# Patient Record
Sex: FEMALE | Race: WHITE | Employment: OTHER | URBAN - NONMETROPOLITAN AREA
[De-identification: names, ages, dates, MRNs, and addresses within clinical notes are randomized per-mention and may not be internally consistent; named-entity substitution may affect disease eponyms.]

---

## 2018-01-03 ENCOUNTER — HOSPITAL ENCOUNTER (OUTPATIENT)
Dept: WOMENS IMAGING | Age: 63
Discharge: HOME | End: 2018-01-03
Payer: COMMERCIAL

## 2018-01-03 DIAGNOSIS — Z12.31 VISIT FOR SCREENING MAMMOGRAM: ICD-10-CM

## 2018-01-03 PROCEDURE — 77067 SCR MAMMO BI INCL CAD: CPT

## 2018-05-16 ENCOUNTER — HOSPITAL ENCOUNTER (EMERGENCY)
Age: 63
Discharge: HOME | End: 2018-05-16
Attending: EMERGENCY MEDICINE
Payer: COMMERCIAL

## 2018-05-16 VITALS
TEMPERATURE: 98.6 F | WEIGHT: 115 LBS | SYSTOLIC BLOOD PRESSURE: 123 MMHG | RESPIRATION RATE: 16 BRPM | HEART RATE: 83 BPM | DIASTOLIC BLOOD PRESSURE: 68 MMHG | OXYGEN SATURATION: 97 %

## 2018-05-16 DIAGNOSIS — N30.00 ACUTE CYSTITIS WITHOUT HEMATURIA: Primary | ICD-10-CM

## 2018-05-16 LAB
AMORPHOUS: ABNORMAL
BACTERIA: ABNORMAL /HPF
BILIRUBIN URINE: ABNORMAL
BLOOD, URINE: ABNORMAL
CASTS UA: ABNORMAL /LPF
CHARACTER, URINE: ABNORMAL
COLOR: ABNORMAL
CRYSTALS, UA: ABNORMAL
EPITHELIAL CELLS, UA: ABNORMAL /HPF
GLUCOSE URINE: ABNORMAL MG/DL
ICTOTEST: NEGATIVE
KETONES, URINE: ABNORMAL
LEUKOCYTE ESTERASE, URINE: ABNORMAL
NITRITE, URINE: ABNORMAL
PH UA: ABNORMAL
PROTEIN UA: ABNORMAL
RBC URINE: ABNORMAL /HPF
SPECIFIC GRAVITY, URINE: ABNORMAL (ref 1–1.03)
UROBILINOGEN, URINE: ABNORMAL EU/DL
WBC UA: ABNORMAL /HPF

## 2018-05-16 PROCEDURE — 99213 OFFICE O/P EST LOW 20 MIN: CPT

## 2018-05-16 PROCEDURE — 81001 URINALYSIS AUTO W/SCOPE: CPT

## 2018-05-16 PROCEDURE — 99202 OFFICE O/P NEW SF 15 MIN: CPT | Performed by: NURSE PRACTITIONER

## 2018-05-16 RX ORDER — NITROFURANTOIN 25; 75 MG/1; MG/1
100 CAPSULE ORAL 2 TIMES DAILY
Qty: 10 CAPSULE | Refills: 0 | Status: SHIPPED | OUTPATIENT
Start: 2018-05-16 | End: 2018-05-21

## 2018-05-16 ASSESSMENT — ENCOUNTER SYMPTOMS
NAUSEA: 0
DIARRHEA: 0
ABDOMINAL PAIN: 0
VOMITING: 0
CONSTIPATION: 0

## 2018-05-21 ENCOUNTER — NURSE TRIAGE (OUTPATIENT)
Dept: ADMINISTRATIVE | Age: 63
End: 2018-05-21

## 2018-06-16 ENCOUNTER — HOSPITAL ENCOUNTER (EMERGENCY)
Age: 63
Discharge: HOME | End: 2018-06-16
Payer: COMMERCIAL

## 2018-06-16 VITALS
SYSTOLIC BLOOD PRESSURE: 152 MMHG | HEIGHT: 66 IN | DIASTOLIC BLOOD PRESSURE: 72 MMHG | OXYGEN SATURATION: 98 % | WEIGHT: 115 LBS | HEART RATE: 89 BPM | BODY MASS INDEX: 18.48 KG/M2 | RESPIRATION RATE: 16 BRPM

## 2018-06-16 DIAGNOSIS — L25.5 CONTACT DERMATITIS DUE TO PLANT: Primary | ICD-10-CM

## 2018-06-16 PROCEDURE — 96372 THER/PROPH/DIAG INJ SC/IM: CPT

## 2018-06-16 PROCEDURE — 6360000002 HC RX W HCPCS: Performed by: NURSE PRACTITIONER

## 2018-06-16 PROCEDURE — 99213 OFFICE O/P EST LOW 20 MIN: CPT | Performed by: NURSE PRACTITIONER

## 2018-06-16 PROCEDURE — 99213 OFFICE O/P EST LOW 20 MIN: CPT

## 2018-06-16 RX ORDER — TRIAMCINOLONE ACETONIDE 1 MG/G
CREAM TOPICAL
Qty: 80 G | Refills: 0 | Status: SHIPPED | OUTPATIENT
Start: 2018-06-16 | End: 2018-10-05 | Stop reason: ALTCHOICE

## 2018-06-16 RX ORDER — METHYLPREDNISOLONE ACETATE 80 MG/ML
80 INJECTION, SUSPENSION INTRA-ARTICULAR; INTRALESIONAL; INTRAMUSCULAR; SOFT TISSUE ONCE
Status: COMPLETED | OUTPATIENT
Start: 2018-06-16 | End: 2018-06-16

## 2018-06-16 RX ORDER — METHYLPREDNISOLONE 4 MG/1
TABLET ORAL
Qty: 1 KIT | Refills: 0 | Status: SHIPPED | OUTPATIENT
Start: 2018-06-16 | End: 2018-06-16

## 2018-06-16 RX ORDER — METHYLPREDNISOLONE 4 MG/1
TABLET ORAL
Qty: 1 KIT | Refills: 0 | Status: SHIPPED | OUTPATIENT
Start: 2018-06-16 | End: 2018-06-22

## 2018-06-16 RX ADMIN — METHYLPREDNISOLONE ACETATE 80 MG: 80 INJECTION, SUSPENSION INTRA-ARTICULAR; INTRALESIONAL; INTRAMUSCULAR; SOFT TISSUE at 18:38

## 2018-06-16 ASSESSMENT — ENCOUNTER SYMPTOMS
COUGH: 0
CHOKING: 0
EYES NEGATIVE: 1
SORE THROAT: 0
GASTROINTESTINAL NEGATIVE: 1
ALLERGIC/IMMUNOLOGIC NEGATIVE: 1
SHORTNESS OF BREATH: 0

## 2018-06-22 ENCOUNTER — OFFICE VISIT (OUTPATIENT)
Dept: FAMILY MEDICINE CLINIC | Age: 63
End: 2018-06-22
Payer: COMMERCIAL

## 2018-06-22 VITALS
HEART RATE: 80 BPM | BODY MASS INDEX: 18.24 KG/M2 | SYSTOLIC BLOOD PRESSURE: 126 MMHG | RESPIRATION RATE: 18 BRPM | DIASTOLIC BLOOD PRESSURE: 86 MMHG | TEMPERATURE: 98.1 F | WEIGHT: 113 LBS

## 2018-06-22 DIAGNOSIS — L23.7 ALLERGIC CONTACT DERMATITIS DUE TO PLANT: Primary | ICD-10-CM

## 2018-06-22 PROCEDURE — G8419 CALC BMI OUT NRM PARAM NOF/U: HCPCS | Performed by: NURSE PRACTITIONER

## 2018-06-22 PROCEDURE — G8427 DOCREV CUR MEDS BY ELIG CLIN: HCPCS | Performed by: NURSE PRACTITIONER

## 2018-06-22 PROCEDURE — 1036F TOBACCO NON-USER: CPT | Performed by: NURSE PRACTITIONER

## 2018-06-22 PROCEDURE — 3017F COLORECTAL CA SCREEN DOC REV: CPT | Performed by: NURSE PRACTITIONER

## 2018-06-22 PROCEDURE — 96372 THER/PROPH/DIAG INJ SC/IM: CPT | Performed by: NURSE PRACTITIONER

## 2018-06-22 PROCEDURE — 99213 OFFICE O/P EST LOW 20 MIN: CPT | Performed by: NURSE PRACTITIONER

## 2018-06-22 RX ORDER — PREDNISONE 10 MG/1
TABLET ORAL
Qty: 30 TABLET | Refills: 0 | Status: SHIPPED | OUTPATIENT
Start: 2018-06-22 | End: 2018-07-02

## 2018-06-22 RX ORDER — HYDROXYZINE HYDROCHLORIDE 25 MG/1
25 TABLET, FILM COATED ORAL 3 TIMES DAILY PRN
Qty: 30 TABLET | Refills: 0 | Status: SHIPPED | OUTPATIENT
Start: 2018-06-22 | End: 2018-07-22

## 2018-06-22 RX ORDER — METHYLPREDNISOLONE ACETATE 80 MG/ML
80 INJECTION, SUSPENSION INTRA-ARTICULAR; INTRALESIONAL; INTRAMUSCULAR; SOFT TISSUE ONCE
Status: COMPLETED | OUTPATIENT
Start: 2018-06-22 | End: 2018-06-22

## 2018-06-22 RX ADMIN — METHYLPREDNISOLONE ACETATE 80 MG: 80 INJECTION, SUSPENSION INTRA-ARTICULAR; INTRALESIONAL; INTRAMUSCULAR; SOFT TISSUE at 08:25

## 2018-06-22 ASSESSMENT — PATIENT HEALTH QUESTIONNAIRE - PHQ9
1. LITTLE INTEREST OR PLEASURE IN DOING THINGS: 0
SUM OF ALL RESPONSES TO PHQ QUESTIONS 1-9: 0
2. FEELING DOWN, DEPRESSED OR HOPELESS: 0
SUM OF ALL RESPONSES TO PHQ9 QUESTIONS 1 & 2: 0

## 2018-06-28 ASSESSMENT — ENCOUNTER SYMPTOMS
EYES NEGATIVE: 1
GASTROINTESTINAL NEGATIVE: 1
RESPIRATORY NEGATIVE: 1

## 2018-08-20 ENCOUNTER — NURSE TRIAGE (OUTPATIENT)
Dept: ADMINISTRATIVE | Age: 63
End: 2018-08-20

## 2018-08-21 ENCOUNTER — OFFICE VISIT (OUTPATIENT)
Dept: FAMILY MEDICINE CLINIC | Age: 63
End: 2018-08-21
Payer: COMMERCIAL

## 2018-08-21 VITALS
DIASTOLIC BLOOD PRESSURE: 60 MMHG | RESPIRATION RATE: 16 BRPM | SYSTOLIC BLOOD PRESSURE: 100 MMHG | BODY MASS INDEX: 18.08 KG/M2 | TEMPERATURE: 97.8 F | WEIGHT: 112 LBS | HEART RATE: 96 BPM

## 2018-08-21 DIAGNOSIS — M54.50 ACUTE BILATERAL LOW BACK PAIN WITHOUT SCIATICA: Primary | ICD-10-CM

## 2018-08-21 PROCEDURE — 3017F COLORECTAL CA SCREEN DOC REV: CPT | Performed by: NURSE PRACTITIONER

## 2018-08-21 PROCEDURE — 1036F TOBACCO NON-USER: CPT | Performed by: NURSE PRACTITIONER

## 2018-08-21 PROCEDURE — 99213 OFFICE O/P EST LOW 20 MIN: CPT | Performed by: NURSE PRACTITIONER

## 2018-08-21 PROCEDURE — G8427 DOCREV CUR MEDS BY ELIG CLIN: HCPCS | Performed by: NURSE PRACTITIONER

## 2018-08-21 PROCEDURE — G8419 CALC BMI OUT NRM PARAM NOF/U: HCPCS | Performed by: NURSE PRACTITIONER

## 2018-08-21 RX ORDER — CYCLOBENZAPRINE HCL 5 MG
5 TABLET ORAL 3 TIMES DAILY PRN
Qty: 30 TABLET | Refills: 0 | Status: SHIPPED | OUTPATIENT
Start: 2018-08-21 | End: 2018-08-31

## 2018-08-21 RX ORDER — METHYLPREDNISOLONE 4 MG/1
TABLET ORAL
Qty: 1 KIT | Refills: 0 | Status: SHIPPED | OUTPATIENT
Start: 2018-08-21 | End: 2018-08-27

## 2018-08-21 ASSESSMENT — ENCOUNTER SYMPTOMS
WHEEZING: 0
COLOR CHANGE: 0
NAUSEA: 0
VOMITING: 0
BOWEL INCONTINENCE: 0
BACK PAIN: 1
SHORTNESS OF BREATH: 0
DIARRHEA: 0
ABDOMINAL PAIN: 0

## 2018-08-21 NOTE — PATIENT INSTRUCTIONS
Ibuprofen 600 mg every 8 hours around the clock     Patient Education        Low Back Pain: Exercises  Your Care Instructions  Here are some examples of typical rehabilitation exercises for your condition. Start each exercise slowly. Ease off the exercise if you start to have pain. Your doctor or physical therapist will tell you when you can start these exercises and which ones will work best for you. How to do the exercises  Press-up    1. Lie on your stomach, supporting your body with your forearms. 2. Press your elbows down into the floor to raise your upper back. As you do this, relax your stomach muscles and allow your back to arch without using your back muscles. As your press up, do not let your hips or pelvis come off the floor. 3. Hold for 15 to 30 seconds, then relax. 4. Repeat 2 to 4 times. Alternate arm and leg (bird dog) exercise    Do this exercise slowly. Try to keep your body straight at all times, and do not let one hip drop lower than the other. 1. Start on the floor, on your hands and knees. 2. Tighten your belly muscles. 3. Raise one leg off the floor, and hold it straight out behind you. Be careful not to let your hip drop down, because that will twist your trunk. 4. Hold for about 6 seconds, then lower your leg and switch to the other leg. 5. Repeat 8 to 12 times on each leg. 6. Over time, work up to holding for 10 to 30 seconds each time. 7. If you feel stable and secure with your leg raised, try raising the opposite arm straight out in front of you at the same time. Knee-to-chest exercise    1. Lie on your back with your knees bent and your feet flat on the floor. 2. Bring one knee to your chest, keeping the other foot flat on the floor (or keeping the other leg straight, whichever feels better on your lower back). 3. Keep your lower back pressed to the floor. Hold for at least 15 to 30 seconds. 4. Relax, and lower the knee to the starting position.   5. Repeat with the other leg. Repeat 2 to 4 times with each leg. 6. To get more stretch, put your other leg flat on the floor while pulling your knee to your chest.  Curl-ups    1. Lie on the floor on your back with your knees bent at a 90-degree angle. Your feet should be flat on the floor, about 12 inches from your buttocks. 2. Cross your arms over your chest. If this bothers your neck, try putting your hands behind your neck (not your head), with your elbows spread apart. 3. Slowly tighten your belly muscles and raise your shoulder blades off the floor. 4. Keep your head in line with your body, and do not press your chin to your chest.  5. Hold this position for 1 or 2 seconds, then slowly lower yourself back down to the floor. 6. Repeat 8 to 12 times. Pelvic tilt exercise    1. Lie on your back with your knees bent. 2. \"Brace\" your stomach. This means to tighten your muscles by pulling in and imagining your belly button moving toward your spine. You should feel like your back is pressing to the floor and your hips and pelvis are rocking back. 3. Hold for about 6 seconds while you breathe smoothly. 4. Repeat 8 to 12 times. Heel dig bridging    1. Lie on your back with both knees bent and your ankles bent so that only your heels are digging into the floor. Your knees should be bent about 90 degrees. 2. Then push your heels into the floor, squeeze your buttocks, and lift your hips off the floor until your shoulders, hips, and knees are all in a straight line. 3. Hold for about 6 seconds as you continue to breathe normally, and then slowly lower your hips back down to the floor and rest for up to 10 seconds. 4. Do 8 to 12 repetitions. Hamstring stretch in doorway    1. Lie on your back in a doorway, with one leg through the open door. 2. Slide your leg up the wall to straighten your knee. You should feel a gentle stretch down the back of your leg. 3. Hold the stretch for at least 15 to 30 seconds.  Do not arch your off the exercise if you start to have pain. Your doctor or physical therapist will tell you when you can start these exercises and which ones will work best for you. How to do the exercises  Press-up    5. Lie on your stomach, supporting your body with your forearms. 6. Press your elbows down into the floor to raise your upper back. As you do this, relax your stomach muscles and allow your back to arch without using your back muscles. As your press up, do not let your hips or pelvis come off the floor. 7. Hold for 15 to 30 seconds, then relax. 8. Repeat 2 to 4 times. Alternate arm and leg (bird dog) exercise    Do this exercise slowly. Try to keep your body straight at all times, and do not let one hip drop lower than the other. 8. Start on the floor, on your hands and knees. 9. Tighten your belly muscles. 10. Raise one leg off the floor, and hold it straight out behind you. Be careful not to let your hip drop down, because that will twist your trunk. 11. Hold for about 6 seconds, then lower your leg and switch to the other leg. 12. Repeat 8 to 12 times on each leg. 13. Over time, work up to holding for 10 to 30 seconds each time. 14. If you feel stable and secure with your leg raised, try raising the opposite arm straight out in front of you at the same time. Knee-to-chest exercise    7. Lie on your back with your knees bent and your feet flat on the floor. 8. Bring one knee to your chest, keeping the other foot flat on the floor (or keeping the other leg straight, whichever feels better on your lower back). 9. Keep your lower back pressed to the floor. Hold for at least 15 to 30 seconds. 10. Relax, and lower the knee to the starting position. 11. Repeat with the other leg. Repeat 2 to 4 times with each leg. 12. To get more stretch, put your other leg flat on the floor while pulling your knee to your chest.  Curl-ups    7. Lie on the floor on your back with your knees bent at a 90-degree angle.

## 2018-08-21 NOTE — PROGRESS NOTES
daily as needed for Muscle spasms    Ibuprofen 600 mg every 8 hours around the clock for pain  Alternate heat and ice   Take another week off at the gym and do at home exercises  Call office if symptoms don't continue to improve, or with any other questions or concerns    Return if symptoms worsen or fail to improve. Patient instructions given and reviewed.         Electronically signed by ZAHIRA Olivera CNP on 8/21/2018 at 8:08 AM

## 2018-09-26 ENCOUNTER — OFFICE VISIT (OUTPATIENT)
Dept: FAMILY MEDICINE CLINIC | Age: 63
End: 2018-09-26
Payer: COMMERCIAL

## 2018-09-26 VITALS
TEMPERATURE: 97.7 F | HEART RATE: 72 BPM | SYSTOLIC BLOOD PRESSURE: 112 MMHG | DIASTOLIC BLOOD PRESSURE: 72 MMHG | WEIGHT: 112.6 LBS | BODY MASS INDEX: 18.17 KG/M2 | RESPIRATION RATE: 16 BRPM

## 2018-09-26 DIAGNOSIS — E05.90 HYPERTHYROIDISM: Primary | ICD-10-CM

## 2018-09-26 PROCEDURE — G8419 CALC BMI OUT NRM PARAM NOF/U: HCPCS | Performed by: NURSE PRACTITIONER

## 2018-09-26 PROCEDURE — G8427 DOCREV CUR MEDS BY ELIG CLIN: HCPCS | Performed by: NURSE PRACTITIONER

## 2018-09-26 PROCEDURE — 3017F COLORECTAL CA SCREEN DOC REV: CPT | Performed by: NURSE PRACTITIONER

## 2018-09-26 PROCEDURE — 99213 OFFICE O/P EST LOW 20 MIN: CPT | Performed by: NURSE PRACTITIONER

## 2018-09-26 PROCEDURE — 1036F TOBACCO NON-USER: CPT | Performed by: NURSE PRACTITIONER

## 2018-09-26 ASSESSMENT — ENCOUNTER SYMPTOMS
ABDOMINAL PAIN: 0
WHEEZING: 0
SHORTNESS OF BREATH: 0
COUGH: 0
ROS SKIN COMMENTS: LOSING HAIR
EYE PAIN: 0
COLOR CHANGE: 0
BACK PAIN: 0

## 2018-09-26 NOTE — PATIENT INSTRUCTIONS
sign in to your [employer] account. Enter F554 in the [employer] box to learn more about \"Hyperthyroidism: Care Instructions. \"     If you do not have an account, please click on the \"Sign Up Now\" link. Current as of: May 12, 2017  Content Version: 11.7  © 5592-4113 [employer]. Care instructions adapted under license by [facility] ([facility]). If you have questions about a medical condition or this instruction, always ask your healthcare professional. [address] any warranty or liability for your use of this information. Patient Education        Hyperthyroidism: Care Instructions  Your Care Instructions  Hyperthyroidism occurs when the thyroid gland makes too much thyroid hormone. This speeds up your metabolism-how your body uses energy. This condition can cause you to be very active, lose weight, and have sleep problems, eye problems, and a fast heart rate. It can also cause a goiter. A goiter is an enlarged thyroid gland that you can see at the front of the neck. Hyperthyroidism is often caused by Graves' disease. In Graves' disease, the body's defense (immune) system attacks the thyroid gland. Your doctor may prescribe a beta-blocker medicine to slow your pulse and calm you down. But this is not a treatment for hyperthyroidism. It is given for your fast heart rate. Your doctor may also give you antithyroid medicine. This medicine keeps excess thyroid hormone in check. In some cases, doctors recommend radioactive iodine or surgery to remove the thyroid. After either of these treatments, you may need to take medicine to replace thyroid hormone for the rest of your life. Follow-up care is a key part of your treatment and safety. Be sure to make and go to all appointments, and call your doctor if you are having problems. It's also a good idea to know your test results and keep a list of the medicines you take. How can you care for yourself at home?   · Take your medicines exactly as prescribed. You need to take the thyroid medicine at the same time each day. Call your doctor if you think you are having a problem with your medicine. · Graves' disease can make your eyes sore. Use artificial tears, eye drops, and sunglasses to protect your eyes from dryness, wind, and sun. Raise your head with pillows at night to prevent your eyes from swelling. In some cases, taping your eyelids shut at night will keep your eyes from being dry in the morning. · Make sure you get enough calcium. Foods that are rich in calcium include milk, yogurt, cheese, and dark green vegetables. · If you need to gain weight, ask your doctor about special diets. · Do not eat kelp. Charlsie Cart is high in iodine, which can make hyperthyroidism worse. Charlsie Cart is commonly used in [employer] and other [employer] foods. You can use iodized salt and eat bread and seafood. Try to eat a balanced diet. · Do not use caffeine and other stimulants. These can make symptoms worse, such as a fast heartbeat, nervousness, and problems focusing. · Do not smoke. Smoking can make your condition worse and may lead to more serious eye problems. If you need help quitting, talk to your doctor about stop-smoking programs and medicines. These can increase your chances of quitting for good. · Lower your stress. Learn to use biofeedback, guided imagery, meditation, or other methods to relax. · Use creams or ointments for irritated skin. Ask your doctor which type to use. · Tell all your doctors about your condition. They need to know because some medicines contain iodine. When should you call for help? Call your doctor now or seek immediate medical care if:    · You have symptoms of a sudden, very high thyroid level (thyroid storm). These include:  ¨ Being nauseated, vomiting, and having diarrhea. ¨ Sweating a lot. ¨ Feeling extremely restless and confused. ¨ Having a high fever.   ¨ Having a fast heartbeat.     · You have sudden vision changes or eye pain.     · You have a fever or severe sore throat and are taking antithyroid medicines, such as PTU or methimazole.    Watch closely for changes in your health, and be sure to contact your doctor if:    · You have a sore throat or have problems swallowing.     · You have swollen, itchy, or red eyes or your other eye symptoms get worse, or you have new vision problems.     · You have signs of a low thyroid level (hypothyroidism). You may feel very tired, confused, or weak. Where can you learn more? Go to https://[employer]Task.it.cocone. org and sign in to your [employer] account. Enter N705 in the [employer] box to learn more about \"Hyperthyroidism: Care Instructions. \"     If you do not have an account, please click on the \"Sign Up Now\" link. Current as of: May 12, 2017  Content Version: 11.7  © 0652-9588 [employer]. Care instructions adapted under license by [facility] ([facility]). If you have questions about a medical condition or this instruction, always ask your healthcare professional. [place] [address] any warranty or liability for your use of this information. Patient Education          methimazole  Pronunciation:  me THIM a zole  Brand:  Tapazole  What is the most important information I should know about methimazole? You should not breast-feed while using this medicine. What is methimazole? Methimazole prevents the thyroid gland from producing too much thyroid hormone. Methimazole is used to treat hyperthyroidism (overactive thyroid). It is also used before thyroid surgery or radioactive iodine treatment. Methimazole may also be used for purposes not listed in this medication guide. What should I discuss with my healthcare provider before taking methimazole? You should not use methimazole if you are allergic to it, or:  · if you are pregnant or breast-feeding.   To make sure methimazole is safe for you, tell your doctor if you have:  · liver

## 2018-09-26 NOTE — PROGRESS NOTES
[facility]  [address] [address]  Dept: [phone]  Dept Fax: [phone]: [phone]     Visit Date:  9/26/2018      Patient:  Torsten Mancia  YOB: 1955    HPI:     Chief Complaint   Patient presents with   [address]     pt states that she was given results of hyperthyroidism,        HPI    Pt reports that she noticed about 2 weeks ago that she was losing more hair than normal, she called her OBGYN and they ordered TSH and Free T4 which showed hyperthyroidism. 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 Pt denies any heart racing, weakness or dizziness. She denies any fever, chills or weight changes. Pt does have hot flashes sometimes, but its usually when she is working out. Medications    Current Outpatient Prescriptions:     Biotin w/ Vitamins C & E (HAIR/SKIN/NAILS PO), Take by mouth, Disp: , Rfl:     Multiple Vitamins-Minerals (MULTIVITAMIN ADULT PO), Take by mouth, Disp: , Rfl:     Omega-3 Fatty Acids (OMEGA 3 500 PO), Take by mouth, Disp: , Rfl:     hydrocortisone 2.5 % cream, Apply topically 2 times daily. , Disp: 1 Tube, Rfl: 1    triamcinolone (KENALOG) 0.1 % cream, Apply topically 2 times daily. , Disp: 80 g, Rfl: 0    The patient has No Known Allergies. Past Medical History  Yulinaa Olivas  has no past medical history on file. Subjective:      Review of Systems   Constitutional: Negative for activity change, chills, fatigue and fever. Eyes: Negative for pain and visual disturbance. Respiratory: Negative for cough, shortness of breath and wheezing. Cardiovascular: Negative for chest pain, palpitations and leg swelling. Gastrointestinal: Negative for abdominal pain. Endocrine: Positive for heat intolerance. Negative for cold intolerance, polydipsia, polyphagia and polyuria.    Genitourinary: Negative for difficulty

## 2018-09-27 LAB
ABSOLUTE BASO #: 0 K/UL (ref 0–0.1)
ABSOLUTE EOS #: 0.1 K/UL (ref 0.1–0.4)
ABSOLUTE LYMPH #: 1.6 K/UL (ref 0.8–5.2)
ABSOLUTE MONO #: 0.6 K/UL (ref 0.1–0.9)
ABSOLUTE NEUT #: 3.7 K/UL (ref 1.3–9.1)
BASOPHILS RELATIVE PERCENT: 0.3 %
EOSINOPHILS RELATIVE PERCENT: 2.2 %
HCT VFR BLD CALC: 39.9 % (ref 36–48)
HEMOGLOBIN: 13.9 G/DL (ref 12–16)
LYMPHOCYTE %: 25.8 %
MCH RBC QN AUTO: 30.1 PG (ref 27–34)
MCHC RBC AUTO-ENTMCNC: 34.8 G/DL (ref 31–36)
MCV RBC AUTO: 86.4 FL (ref 80–100)
MONOCYTES # BLD: 9.3 %
NEUTROPHILS RELATIVE PERCENT: 62.2 %
PDW BLD-RTO: 12.8 % (ref 10.8–14.8)
PLATELETS: 260 K/UL (ref 150–450)
RBC: 4.62 M/UL (ref 4–5.5)
WBC: 6 K/UL (ref 3.7–10.8)

## 2018-09-28 LAB
T3 FREE: 4.5 PG/ML (ref 2.2–4.2)
THYROID PEROXIDASE ANTIBODY: 4 IU/ML

## 2018-10-02 ENCOUNTER — TELEPHONE (OUTPATIENT)
Dept: FAMILY MEDICINE CLINIC | Age: 63
End: 2018-10-02

## 2018-10-02 NOTE — TELEPHONE ENCOUNTER
We can call pt and see if she is having any other symptoms on her scalp that might be causing the hair loss, a rash, or and itching.

## 2018-10-03 ENCOUNTER — TELEPHONE (OUTPATIENT)
Dept: FAMILY MEDICINE CLINIC | Age: 63
End: 2018-10-03

## 2018-10-03 NOTE — TELEPHONE ENCOUNTER
Pt called and received lab work done last appt, needed to know if it was low iron or D, if the lab work shown anything regarding that, has appt on 10/5    Please advise

## 2018-10-05 ENCOUNTER — OFFICE VISIT (OUTPATIENT)
Dept: FAMILY MEDICINE CLINIC | Age: 63
End: 2018-10-05
Payer: COMMERCIAL

## 2018-10-05 VITALS
HEART RATE: 84 BPM | TEMPERATURE: 98.2 F | RESPIRATION RATE: 16 BRPM | DIASTOLIC BLOOD PRESSURE: 80 MMHG | SYSTOLIC BLOOD PRESSURE: 122 MMHG | BODY MASS INDEX: 18.17 KG/M2 | WEIGHT: 112.6 LBS

## 2018-10-05 DIAGNOSIS — L65.9 HAIR LOSS: Primary | ICD-10-CM

## 2018-10-05 DIAGNOSIS — R53.83 DECREASED ENERGY: ICD-10-CM

## 2018-10-05 PROCEDURE — G8427 DOCREV CUR MEDS BY ELIG CLIN: HCPCS | Performed by: NURSE PRACTITIONER

## 2018-10-05 PROCEDURE — 1036F TOBACCO NON-USER: CPT | Performed by: NURSE PRACTITIONER

## 2018-10-05 PROCEDURE — 99212 OFFICE O/P EST SF 10 MIN: CPT | Performed by: NURSE PRACTITIONER

## 2018-10-05 PROCEDURE — G8419 CALC BMI OUT NRM PARAM NOF/U: HCPCS | Performed by: NURSE PRACTITIONER

## 2018-10-05 PROCEDURE — 3017F COLORECTAL CA SCREEN DOC REV: CPT | Performed by: NURSE PRACTITIONER

## 2018-10-05 PROCEDURE — G8484 FLU IMMUNIZE NO ADMIN: HCPCS | Performed by: NURSE PRACTITIONER

## 2018-10-05 ASSESSMENT — ENCOUNTER SYMPTOMS
FACIAL SWELLING: 0
BACK PAIN: 0
TROUBLE SWALLOWING: 0
SORE THROAT: 0
EYE PAIN: 0

## 2018-10-05 NOTE — PROGRESS NOTES
[facility]  [address] [address]  Dept: [phone]  Dept Fax: [phone]: [phone]     Visit Date:  10/5/2018      Patient:  Nam Mooney  YOB: 1955    HPI:     Chief Complaint   Patient presents with    Alopecia     hair keeps falling out \"like crazy\". pts friend same sx's and had low vit d and low iron    Discuss Labs       HPI    Pt presents to the office for follow up from hair loss appointment. Pt originally presented to the office with abnormal TSH. She has not scheduled her thyroid uptake and scan yet because she was told that she had to stop her Omega 3, and multivitamins for 4 weeks prior to study, and she didn't want to do that. Pt brought a bag of hair that she noticed falling out over the past 2 days. Pt also is worried because she has decreased energy over the past few months. Pt is wondering if her iron, or vitamin D is low. Pt denies any dizziness, or syncope. Medications    Current Outpatient Prescriptions:     Biotin w/ Vitamins C & E (HAIR/SKIN/NAILS PO), Take by mouth, Disp: , Rfl:     Multiple Vitamins-Minerals (MULTIVITAMIN ADULT PO), Take by mouth, Disp: , Rfl:     Omega-3 Fatty Acids (OMEGA 3 500 PO), Take by mouth, Disp: , Rfl:     The patient has No Known Allergies. Past Medical History  Krystal Godwin  has no past medical history on file. Subjective:      Review of Systems   Constitutional: Positive for activity change. Negative for chills, fatigue and fever. HENT: Negative for congestion, ear pain, facial swelling, sore throat and trouble swallowing.         + hair loss     Eyes: Negative for pain and visual disturbance. Cardiovascular: Negative for chest pain and palpitations. Musculoskeletal: Negative for back pain and joint swelling. Neurological: Negative for dizziness, weakness and headaches.        Objective:     /80   Pulse 84   Temp 98.2 °F (36.8 °C) (Oral)   Resp 16   Wt 112 lb 9.6 oz (51.1 kg)   LMP 11/16/2017   BMI 18.17 kg/m²     Physical Exam   Constitutional: She is oriented to person, place, and time. She appears well-developed and well-nourished. No distress. HENT:   Head: Normocephalic and atraumatic. Nose: Nose normal.   Negative hair pull test.  Pt has no patches of hair loss, no rash or dermatitis to scalp. Eyes: Right eye exhibits no discharge. Left eye exhibits no discharge. Pulmonary/Chest: Effort normal. No respiratory distress. Neurological: She is alert and oriented to person, place, and time. Coordination normal.   Skin: Skin is warm and dry. Psychiatric: She has a normal mood and affect. Her behavior is normal. Judgment and thought content normal.   Vitals reviewed. Component      Latest Ref Rng & Units 9/26/2018   WBC      3.7 - 10.8 K/ul 6.0   RBC      4.00 - 5.50 M/ul 4.62   Hemoglobin Quant      12.0 - 16.0 g/dL 13.9   Hematocrit      36.0 - 48.0 % 39.9   MCV      80 - 100 fl 86.4   MCH      27.0 - 34.0 pg 30.1   MCHC      31.0 - 36.0 g/dl 34.8   RDW      10.8 - 14.8 % 12.8   Platelet Count      359 - 450 K/ul 260   Absolute Neut #      1.3 - 9.1 K/ul 3.7   Neutrophils %      % 62.2   Absolute Lymph #      0.8 - 5.2 K/ul 1.6   Lymphocyte %      % 25.8   Absolute Mono #      0.1 - 0.9 K/ul 0.6   Monocytes      % 9.3   Absolute Eos #      0.1 - 0.4 K/ul 0.1   Eosinophils %      % 2.2   Basophils #      0.0 - 0.1 K/ul 0.0   Basophils %      % 0.3   T3, Free      2.2 - 4.2 PG/ML 4.5 (H)   Thyroid Peroxidase Antibody      <9 IU/ML 4       Assessment/Plan:      Godfrey Wesley was seen today for alopecia and discuss labs. Diagnoses and all orders for this visit:    Hair loss  -     Vitamin B12 & Folate; Future  -     Vitamin D 1,25 Dihydroxy; Future  -     TSH; Future  -     T4; Future  -     Iron and TIBC; Future    Decreased energy  -     Vitamin B12 & Folate; Future  -     Vitamin D 1,25 Dihydroxy;  Future  - TSH; Future  -     T4; Future  -     Iron and TIBC; Future    - Pt reassured that hair loss of  daily is normal, and with her hair being long she it may look like she is losing more hair than she actually is.   - Pt will schedule her Thyroid uptake and scan today and stop her vitamins, pt aware that stopping the vitamins may not help the hair loss, but it is necessary that we explore the low TSH.  - Will await results from blood work and call pt  - Call office with any questions or concerns, or if symptoms are getting worse or changing    Return if symptoms worsen or fail to improve. Patient instructions given and reviewed.         Electronically signed by ZAHIRA Wen CNP on 10/5/2018 at 8:23 AM

## 2018-10-06 LAB
FOLATE: >20 UG/L
IRON SATURATION: 41 % (ref 20–50)
IRON, SERUM: 114 UG/DL (ref 37–145)
T4 TOTAL: 7.7 UG/DL (ref 4.5–12)
TOTAL IRON BINDING CAPACITY: 278 MCG/DL (ref 250–450)
UNSATURATED IRON BINDING CAPACITY: 164.1 UG/DL (ref 112–347)
VITAMIN B-12: 1904 PG/ML (ref 200–950)

## 2018-10-09 ENCOUNTER — TELEPHONE (OUTPATIENT)
Dept: FAMILY MEDICINE CLINIC | Age: 63
End: 2018-10-09

## 2018-10-09 DIAGNOSIS — R74.8 ELEVATED VITAMIN B12 LEVEL: Primary | ICD-10-CM

## 2018-10-10 LAB
ALBUMIN SERPL-MCNC: 4.6 G/DL (ref 3.5–5.2)
ALK PHOSPHATASE: 105 U/L (ref 30–121)
ALT SERPL-CCNC: 22 U/L (ref 5–40)
AST SERPL-CCNC: 24 U/L (ref 9–40)
BILIRUB SERPL-MCNC: 0.4 MG/DL
BILIRUBIN DIRECT: <0.2 MG/DL
TOTAL PROTEIN: 6.9 G/DL (ref 6.1–8.3)

## 2018-10-12 LAB
TSH SERPL DL<=0.05 MIU/L-ACNC: <0.01 UIU/ML (ref 0.4–4.1)
VITAMIN D 1,25-DIHYDROXY: 65.3 PG/ML (ref 20–82)

## 2018-10-15 ENCOUNTER — TELEPHONE (OUTPATIENT)
Dept: FAMILY MEDICINE CLINIC | Age: 63
End: 2018-10-15

## 2018-10-15 NOTE — TELEPHONE ENCOUNTER
She will have to remain off the vitamins until the thyroid testing. At her last appointment the hair loss appeared normal, as she had no bald spots and her hair pull test was negative. Reassure pt that  hairs lost per day is normal.  Once we sort out the thyroid, we can decide where she needs to follow up (either endocrine or possibly dermatology).   Thanks -JOE

## 2018-10-18 ENCOUNTER — TELEPHONE (OUTPATIENT)
Dept: FAMILY MEDICINE CLINIC | Age: 63
End: 2018-10-18

## 2018-10-18 DIAGNOSIS — L65.9 HAIR LOSS: Primary | ICD-10-CM

## 2018-10-18 DIAGNOSIS — R53.83 FATIGUE, UNSPECIFIED TYPE: ICD-10-CM

## 2018-11-01 ENCOUNTER — TELEPHONE (OUTPATIENT)
Dept: FAMILY MEDICINE CLINIC | Age: 63
End: 2018-11-01

## 2018-12-17 ENCOUNTER — HOSPITAL ENCOUNTER (EMERGENCY)
Age: 63
Discharge: HOME | End: 2018-12-17
Payer: COMMERCIAL

## 2018-12-17 VITALS
WEIGHT: 112 LBS | HEIGHT: 66 IN | SYSTOLIC BLOOD PRESSURE: 121 MMHG | OXYGEN SATURATION: 98 % | DIASTOLIC BLOOD PRESSURE: 71 MMHG | TEMPERATURE: 98.2 F | HEART RATE: 78 BPM | RESPIRATION RATE: 16 BRPM | BODY MASS INDEX: 18 KG/M2

## 2018-12-17 DIAGNOSIS — H60.501 ACUTE OTITIS EXTERNA OF RIGHT EAR, UNSPECIFIED TYPE: Primary | ICD-10-CM

## 2018-12-17 PROCEDURE — 99212 OFFICE O/P EST SF 10 MIN: CPT | Performed by: NURSE PRACTITIONER

## 2018-12-17 PROCEDURE — 99212 OFFICE O/P EST SF 10 MIN: CPT

## 2018-12-17 RX ORDER — OFLOXACIN 3 MG/ML
10 SOLUTION AURICULAR (OTIC) DAILY
Qty: 10 ML | Refills: 0 | Status: SHIPPED | OUTPATIENT
Start: 2018-12-17 | End: 2018-12-24

## 2018-12-17 ASSESSMENT — PAIN SCALES - GENERAL: PAINLEVEL_OUTOF10: 6

## 2018-12-17 ASSESSMENT — PAIN DESCRIPTION - PAIN TYPE: TYPE: ACUTE PAIN

## 2018-12-17 ASSESSMENT — ENCOUNTER SYMPTOMS
NAUSEA: 0
DIARRHEA: 0
VOMITING: 0

## 2018-12-17 ASSESSMENT — PAIN DESCRIPTION - ORIENTATION: ORIENTATION: RIGHT

## 2018-12-17 ASSESSMENT — PAIN DESCRIPTION - LOCATION: LOCATION: EAR

## 2018-12-21 ENCOUNTER — OFFICE VISIT (OUTPATIENT)
Dept: FAMILY MEDICINE CLINIC | Age: 63
End: 2018-12-21
Payer: COMMERCIAL

## 2018-12-21 VITALS
BODY MASS INDEX: 18.4 KG/M2 | TEMPERATURE: 98.2 F | DIASTOLIC BLOOD PRESSURE: 68 MMHG | HEART RATE: 84 BPM | WEIGHT: 114 LBS | RESPIRATION RATE: 16 BRPM | SYSTOLIC BLOOD PRESSURE: 98 MMHG

## 2018-12-21 DIAGNOSIS — Z12.11 SCREENING FOR COLON CANCER: ICD-10-CM

## 2018-12-21 DIAGNOSIS — Z13.220 SCREENING, LIPID: ICD-10-CM

## 2018-12-21 DIAGNOSIS — H66.001 ACUTE SUPPURATIVE OTITIS MEDIA OF RIGHT EAR WITHOUT SPONTANEOUS RUPTURE OF TYMPANIC MEMBRANE, RECURRENCE NOT SPECIFIED: Primary | ICD-10-CM

## 2018-12-21 PROCEDURE — 3017F COLORECTAL CA SCREEN DOC REV: CPT | Performed by: NURSE PRACTITIONER

## 2018-12-21 PROCEDURE — 99213 OFFICE O/P EST LOW 20 MIN: CPT | Performed by: NURSE PRACTITIONER

## 2018-12-21 PROCEDURE — 1036F TOBACCO NON-USER: CPT | Performed by: NURSE PRACTITIONER

## 2018-12-21 PROCEDURE — G8427 DOCREV CUR MEDS BY ELIG CLIN: HCPCS | Performed by: NURSE PRACTITIONER

## 2018-12-21 PROCEDURE — G8484 FLU IMMUNIZE NO ADMIN: HCPCS | Performed by: NURSE PRACTITIONER

## 2018-12-21 PROCEDURE — G8419 CALC BMI OUT NRM PARAM NOF/U: HCPCS | Performed by: NURSE PRACTITIONER

## 2018-12-21 RX ORDER — AMOXICILLIN 500 MG/1
500 CAPSULE ORAL 3 TIMES DAILY
Qty: 30 CAPSULE | Refills: 0 | Status: SHIPPED | OUTPATIENT
Start: 2018-12-21 | End: 2018-12-31

## 2018-12-21 ASSESSMENT — ENCOUNTER SYMPTOMS
TROUBLE SWALLOWING: 0
COUGH: 0
SINUS PAIN: 0
DIARRHEA: 0
SORE THROAT: 0

## 2018-12-21 NOTE — PROGRESS NOTES
[facility]  [address] [address]  Dept: [phone]  Dept Fax: [phone]: [phone]     Visit Date:  12/21/2018      Patient:  Samia Gandhi  YOB: 1955    HPI:     Chief Complaint   Patient presents with    Ear Fullness     Here today for [facility] followup right ear infection on monday. c/o still in pain. Pt presents to the office for follow up with ER visit and right ear pain. Pt reports that she thought it was getting better, but when she lays on the right side her ear gets swollen and more painful. Pt denies any drainage from the ear, but can feel that it is swollen. Otalgia    There is pain in the right ear. This is a new problem. Episode onset: 6 days. The problem occurs constantly. The problem has been gradually improving. There has been no fever. The pain is moderate. Pertinent negatives include no coughing, diarrhea, ear discharge, headaches or sore throat. She has tried ear drops for the symptoms. The treatment provided mild relief. There is no history of a chronic ear infection, hearing loss or a tympanostomy tube. Pt also would like to catch up on her shots and health maintenance. Medications    Current Outpatient Prescriptions:     amoxicillin (AMOXIL) 500 MG capsule, Take 1 capsule by mouth 3 times daily for 10 days, Disp: 30 capsule, Rfl: 0    ofloxacin (FLOXIN) 0.3 % otic solution, Place 10 drops into the right ear daily for 7 days, Disp: 10 mL, Rfl: 0    Biotin w/ Vitamins C & E (HAIR/SKIN/NAILS PO), Take by mouth, Disp: , Rfl:     Multiple Vitamins-Minerals (MULTIVITAMIN ADULT PO), Take by mouth, Disp: , Rfl:     Omega-3 Fatty Acids (OMEGA 3 500 PO), Take by mouth, Disp: , Rfl:     The patient has No Known Allergies. Past Medical History  Re Flannery  has no past medical history on file. Subjective:      Review of Systems   HENT: Positive for ear pain.  Negative for congestion, ear discharge, postnasal drip, sinus pain, sore throat and trouble swallowing. Respiratory: Negative for cough. Cardiovascular: Negative for chest pain. Gastrointestinal: Negative for diarrhea. Neurological: Negative for headaches. Objective:     BP 98/68 (Site: Left Upper Arm)   Pulse 84   Temp 98.2 °F (36.8 °C) (Oral)   Resp 16   Wt 114 lb (51.7 kg)   LMP 11/16/2017   BMI 18.40 kg/m²     Physical Exam   Constitutional: She is oriented to person, place, and time. She appears well-developed and well-nourished. No distress. HENT:   Head: Normocephalic and atraumatic. Right Ear: Hearing and external ear normal. There is swelling and tenderness. No drainage. Left Ear: Hearing, tympanic membrane, external ear and ear canal normal.   Nose: Nose normal.   Mouth/Throat: Uvula is midline, oropharynx is clear and moist and mucous membranes are normal.   Eyes: Conjunctivae are normal. Right eye exhibits no discharge. Left eye exhibits no discharge. Pulmonary/Chest: Effort normal. No respiratory distress. Neurological: She is alert and oriented to person, place, and time. Coordination normal.   Skin: Skin is warm and dry. Psychiatric: She has a normal mood and affect. Her behavior is normal. Judgment and thought content normal.   Vitals reviewed. Assessment/Plan:      Juan Miguel Stokes was seen today for ear fullness. Diagnoses and all orders for this visit:    Acute suppurative otitis media of right ear without spontaneous rupture of tympanic membrane, recurrence not specified  -     amoxicillin (AMOXIL) 500 MG capsule; Take 1 capsule by mouth 3 times daily for 10 days    Screening for colon cancer  -     [facility] Valentina Bailey MD ([place])    Screening, lipid  -     Lipid Panel; Future    - Encouraged annual FLU VACCINE after October 1st- Declined   - Encouraged TETANUS SHOT (TdaP/ ADACEL/ BOOSTRIX) per personal pharmacy.   - Encouraged NEW SHINGLES SHOT [facility]) - check with insurance re coverage and location of administration.  - Offered Hep C screening and HIV testing, pt declines due to lack of risk factors  - Lipid screening order given. - Pelvic exam done per [facility]- done yearly    Return in about 1 year (around 12/21/2019), or if symptoms worsen or fail to improve. Patient given educational materials - see patient instructions. Discussed use, benefit, and side effects of prescribed medications. All patient questions answered. Pt voiced understanding.         Electronically signed by ZAHIRA Robles CNP on 12/21/2018 at 2:28 PM

## 2018-12-23 LAB
CHOLESTEROL/HDL RATIO: 2.6
CHOLESTEROL: 206 MG/DL
HDLC SERPL-MCNC: 80.5 MG/DL
LDL CHOLESTEROL CALCULATED: 117 MG/DL
LDL/HDL RATIO: 1.4
TRIGL SERPL-MCNC: 44 MG/DL
VLDLC SERPL CALC-MCNC: 9 MG/DL

## 2018-12-26 ENCOUNTER — TELEPHONE (OUTPATIENT)
Dept: FAMILY MEDICINE CLINIC | Age: 63
End: 2018-12-26

## 2018-12-26 DIAGNOSIS — E78.00 ELEVATED LDL CHOLESTEROL LEVEL: ICD-10-CM

## 2018-12-26 DIAGNOSIS — E78.00 ELEVATED CHOLESTEROL: Primary | ICD-10-CM

## 2018-12-26 NOTE — TELEPHONE ENCOUNTER
----- Message from ZAHIRA Salinas CNP sent at 12/24/2018  8:26 AM EST -----  Please call pt and let her know that her cholesterol is slightly elevated at 206, and her LDL is elevated at 117,  however her HDL or good cholesterol is 80, which is very good. Continue diet and exercise and we will repeat a total cholesterol and LDL in 6 months.   Thanks -WS

## 2019-01-15 ENCOUNTER — HOSPITAL ENCOUNTER (OUTPATIENT)
Dept: WOMENS IMAGING | Age: 64
Discharge: HOME | End: 2019-01-15
Payer: COMMERCIAL

## 2019-01-15 DIAGNOSIS — Z12.31 VISIT FOR SCREENING MAMMOGRAM: ICD-10-CM

## 2019-01-15 PROCEDURE — 77067 SCR MAMMO BI INCL CAD: CPT

## 2019-03-22 ENCOUNTER — TELEPHONE (OUTPATIENT)
Dept: FAMILY MEDICINE CLINIC | Age: 64
End: 2019-03-22

## 2019-03-22 ENCOUNTER — HOSPITAL ENCOUNTER (OUTPATIENT)
Dept: GENERAL RADIOLOGY | Age: 64
Discharge: HOME | End: 2019-03-22
Payer: COMMERCIAL

## 2019-03-22 ENCOUNTER — HOSPITAL ENCOUNTER (OUTPATIENT)
Age: 64
Discharge: HOME | End: 2019-03-22
Payer: COMMERCIAL

## 2019-03-22 ENCOUNTER — OFFICE VISIT (OUTPATIENT)
Dept: FAMILY MEDICINE CLINIC | Age: 64
End: 2019-03-22
Payer: COMMERCIAL

## 2019-03-22 VITALS
SYSTOLIC BLOOD PRESSURE: 94 MMHG | RESPIRATION RATE: 16 BRPM | HEART RATE: 80 BPM | WEIGHT: 118.13 LBS | DIASTOLIC BLOOD PRESSURE: 62 MMHG | TEMPERATURE: 97.9 F | BODY MASS INDEX: 19.07 KG/M2

## 2019-03-22 DIAGNOSIS — R63.5 WEIGHT GAIN: ICD-10-CM

## 2019-03-22 DIAGNOSIS — R93.5 ABNORMAL ABDOMINAL X-RAY: Primary | ICD-10-CM

## 2019-03-22 DIAGNOSIS — R14.0 ABDOMINAL BLOATING: Primary | ICD-10-CM

## 2019-03-22 DIAGNOSIS — R14.0 ABDOMINAL BLOATING: ICD-10-CM

## 2019-03-22 PROCEDURE — G8484 FLU IMMUNIZE NO ADMIN: HCPCS | Performed by: NURSE PRACTITIONER

## 2019-03-22 PROCEDURE — G8420 CALC BMI NORM PARAMETERS: HCPCS | Performed by: NURSE PRACTITIONER

## 2019-03-22 PROCEDURE — 99213 OFFICE O/P EST LOW 20 MIN: CPT | Performed by: NURSE PRACTITIONER

## 2019-03-22 PROCEDURE — G8427 DOCREV CUR MEDS BY ELIG CLIN: HCPCS | Performed by: NURSE PRACTITIONER

## 2019-03-22 PROCEDURE — 74019 RADEX ABDOMEN 2 VIEWS: CPT

## 2019-03-22 PROCEDURE — 1036F TOBACCO NON-USER: CPT | Performed by: NURSE PRACTITIONER

## 2019-03-22 PROCEDURE — 3017F COLORECTAL CA SCREEN DOC REV: CPT | Performed by: NURSE PRACTITIONER

## 2019-03-22 ASSESSMENT — ENCOUNTER SYMPTOMS
SHORTNESS OF BREATH: 0
WHEEZING: 0
NAUSEA: 0
DIARRHEA: 0
COUGH: 0
EYE PAIN: 0
ABDOMINAL PAIN: 0
VOMITING: 0
CONSTIPATION: 0
BLOOD IN STOOL: 0
COLOR CHANGE: 0
BACK PAIN: 0

## 2019-03-29 ENCOUNTER — HOSPITAL ENCOUNTER (OUTPATIENT)
Dept: ULTRASOUND IMAGING | Age: 64
Discharge: HOME | End: 2019-03-29
Payer: COMMERCIAL

## 2019-03-29 DIAGNOSIS — R93.5 ABNORMAL ABDOMINAL X-RAY: ICD-10-CM

## 2019-03-29 PROCEDURE — 76770 US EXAM ABDO BACK WALL COMP: CPT

## 2019-04-03 ENCOUNTER — TELEPHONE (OUTPATIENT)
Dept: FAMILY MEDICINE CLINIC | Age: 64
End: 2019-04-03

## 2019-04-03 DIAGNOSIS — N28.1 RENAL CYST, LEFT: Primary | ICD-10-CM

## 2019-04-03 DIAGNOSIS — N20.0 KIDNEY STONES: ICD-10-CM

## 2019-04-03 DIAGNOSIS — N28.1 RENAL CYST: ICD-10-CM

## 2019-04-03 NOTE — TELEPHONE ENCOUNTER
I spoke to the pt and she stated that she checked with her insurance and Dr. Chuy Darby office is on her plan. Referral placed in Epic for a referral to [facility]. I also called that office and left a message on their new patient scheduling line to have them call the pt to schedule. Pt notified they will be contacting her. Pt is asymptomatic.

## 2019-04-03 NOTE — TELEPHONE ENCOUNTER
Pt notified  She will check her insurance and call us back. ----- Message from ZAHIRA Hurst CNP sent at 3/29/2019 10:10 AM EDT -----  Please let pt know that her US shows bilateral kidney stones, a small cyst on her left kidney and some evidence of possible renal disease. Make sure she is not having any kidney stone symptoms (pain, blood in urine, or difficulty urinating). She needs a referral to urology for kidney stones and evaluate the cyst, we can try and get an appointment ASAP for her.   Thanks -WS

## 2019-04-14 ENCOUNTER — HOSPITAL ENCOUNTER (EMERGENCY)
Age: 64
Discharge: HOME | End: 2019-04-14
Payer: COMMERCIAL

## 2019-04-14 VITALS
TEMPERATURE: 98.1 F | HEART RATE: 74 BPM | WEIGHT: 115 LBS | OXYGEN SATURATION: 97 % | HEIGHT: 66 IN | SYSTOLIC BLOOD PRESSURE: 126 MMHG | DIASTOLIC BLOOD PRESSURE: 67 MMHG | BODY MASS INDEX: 18.48 KG/M2 | RESPIRATION RATE: 16 BRPM

## 2019-04-14 DIAGNOSIS — H66.004 RECURRENT ACUTE SUPPURATIVE OTITIS MEDIA OF RIGHT EAR WITHOUT SPONTANEOUS RUPTURE OF TYMPANIC MEMBRANE: Primary | ICD-10-CM

## 2019-04-14 PROCEDURE — 99213 OFFICE O/P EST LOW 20 MIN: CPT | Performed by: NURSE PRACTITIONER

## 2019-04-14 PROCEDURE — 99212 OFFICE O/P EST SF 10 MIN: CPT

## 2019-04-14 RX ORDER — CEFADROXIL 500 MG/1
500 CAPSULE ORAL 2 TIMES DAILY
Qty: 20 CAPSULE | Refills: 0 | Status: SHIPPED | OUTPATIENT
Start: 2019-04-14 | End: 2019-04-22

## 2019-04-14 ASSESSMENT — ENCOUNTER SYMPTOMS
FACIAL SWELLING: 1
SINUS PRESSURE: 1
ALLERGIC/IMMUNOLOGIC NEGATIVE: 1
COUGH: 0
EYES NEGATIVE: 1
SINUS PAIN: 1
CHEST TIGHTNESS: 0

## 2019-04-14 ASSESSMENT — PAIN DESCRIPTION - ORIENTATION: ORIENTATION: RIGHT

## 2019-04-14 ASSESSMENT — PAIN SCALES - GENERAL: PAINLEVEL_OUTOF10: 7

## 2019-04-14 ASSESSMENT — PAIN DESCRIPTION - FREQUENCY: FREQUENCY: CONTINUOUS

## 2019-04-14 ASSESSMENT — PAIN DESCRIPTION - PROGRESSION: CLINICAL_PROGRESSION: GRADUALLY WORSENING

## 2019-04-14 ASSESSMENT — PAIN DESCRIPTION - DESCRIPTORS: DESCRIPTORS: ACHING;SORE

## 2019-04-14 ASSESSMENT — PAIN DESCRIPTION - PAIN TYPE: TYPE: ACUTE PAIN

## 2019-04-14 ASSESSMENT — PAIN - FUNCTIONAL ASSESSMENT: PAIN_FUNCTIONAL_ASSESSMENT: ACTIVITIES ARE NOT PREVENTED

## 2019-04-14 ASSESSMENT — PAIN DESCRIPTION - LOCATION: LOCATION: EAR

## 2019-04-14 ASSESSMENT — PAIN DESCRIPTION - ONSET: ONSET: GRADUAL

## 2019-04-14 NOTE — ED PROVIDER NOTES
[address] [facility] Encounter      [facility]       Chief Complaint   Patient presents with    Otalgia     Right       Nurses Notes reviewed and I agree except as noted in the HPI. HISTORY OF PRESENT ILLNESS   Viki Dickens is a 60 y.o. female who presents with right ear pain and swelling x 1 day. Hx of recurrent ear infection. Last one was Dec of 2018. She does not swim, no ear plugs, or sports. Reports she has had recurrent ear infections as a child. Has tried warm compresses and this has not helped. Had ear drops from last episode and this has not worked as well. REVIEW OF SYSTEMS     Review of Systems   Constitutional: Positive for activity change and fatigue. HENT: Positive for congestion, ear pain, facial swelling, sinus pressure and sinus pain. Negative for ear discharge. Eyes: Negative. Respiratory: Negative for cough and chest tightness. Cardiovascular: Negative. Endocrine: Negative. Musculoskeletal: Negative. Skin: Positive for pallor. Allergic/Immunologic: Negative. Neurological: Negative for dizziness and headaches. Hematological: Positive for adenopathy. Psychiatric/Behavioral: Negative. PAST MEDICAL HISTORY   History reviewed. No pertinent past medical history. SURGICAL HISTORY     Patient  has a past surgical history that includes [place] tooth extraction. CURRENT MEDICATIONS       Previous Medications    BIOTIN W/ VITAMINS C & E (HAIR/SKIN/NAILS PO)    Take by mouth    MULTIPLE VITAMINS-MINERALS (MULTIVITAMIN ADULT PO)    Take by mouth    OMEGA-3 FATTY ACIDS (OMEGA 3 500 PO)    Take by mouth    PROBIOTIC PRODUCT (PROBIOTIC DAILY PO)    Take by mouth daily       ALLERGIES     Patient is has No Known Allergies. FAMILY HISTORY     Patient'sfamily history includes No Known Problems in her mother. SOCIAL HISTORY     Patient  reports that she has never smoked.  She has never used smokeless tobacco. She reports that she drinks alcohol. She reports that she does not use drugs. PHYSICAL EXAM     ED TRIAGE VITALS  BP: 126/67, Temp: 98.1 °F (36.7 °C), Pulse: 74, Resp: 16, SpO2: 97 %  Physical Exam   Constitutional: She is oriented to person, place, and time. She appears well-developed and well-nourished. HENT:   Head: Normocephalic. Right Ear: There is swelling and tenderness. There is mastoid tenderness. Tympanic membrane is injected, erythematous and bulging. Decreased hearing is noted. Nose: Nose normal.   Mouth/Throat: Uvula is midline, oropharynx is clear and moist and mucous membranes are normal.   Neck: Normal range of motion. Neck supple. Cardiovascular: Normal rate, regular rhythm and normal heart sounds. Pulmonary/Chest: Effort normal.   Musculoskeletal: Normal range of motion. Lymphadenopathy:     She has cervical adenopathy. Neurological: She is alert and oriented to person, place, and time. Skin: Skin is warm and dry. Capillary refill takes less than 2 seconds. Psychiatric: She has a normal mood and affect. Her behavior is normal.   Nursing note and vitals reviewed. DIAGNOSTIC RESULTS   Labs: No results found for this visit on 04/14/19. IMAGING:  No orders to display     URGENT CARE COURSE:     Vitals:    04/14/19 1646   BP: 126/67   Pulse: 74   Resp: 16   Temp: 98.1 °F (36.7 °C)   TempSrc: Oral   SpO2: 97%   Weight: 115 lb (52.2 kg)   Height: 5' 6\" (1.676 m)       Medications - No data to display  PROCEDURES:  None  FINALIMPRESSION      1.  Recurrent acute suppurative otitis media of right ear without spontaneous rupture of tympanic membrane        DISPOSITION/PLAN   DISPOSITION Decision To Discharge 04/14/2019 05:08:02 PM    PATIENT REFERRED TO:  Jennifer Morales MD  [address]  [phone]    In 3 days  As needed, If symptoms worsen    DISCHARGE MEDICATIONS:  New Prescriptions    CEFADROXIL (DURICEF) 500 MG CAPSULE    Take 1 capsule by mouth 2 times daily for 10 days     Current Discharge Medication List          ZAHIRA Bell CNP, APRN - CNP  04/14/19 9911

## 2019-04-14 NOTE — ED TRIAGE NOTES
Pt to [facility] ambulatory with right ear pain. This started on Friday. No fevers.

## 2019-04-19 ENCOUNTER — TELEPHONE (OUTPATIENT)
Dept: FAMILY MEDICINE CLINIC | Age: 64
End: 2019-04-19

## 2019-04-19 DIAGNOSIS — H66.004 RECURRENT ACUTE SUPPURATIVE OTITIS MEDIA OF RIGHT EAR: Primary | ICD-10-CM

## 2019-04-22 ENCOUNTER — OFFICE VISIT (OUTPATIENT)
Dept: UROLOGY | Age: 64
End: 2019-04-22
Payer: COMMERCIAL

## 2019-04-22 VITALS
DIASTOLIC BLOOD PRESSURE: 60 MMHG | WEIGHT: 119 LBS | HEIGHT: 66 IN | SYSTOLIC BLOOD PRESSURE: 116 MMHG | BODY MASS INDEX: 19.13 KG/M2

## 2019-04-22 DIAGNOSIS — N20.0 KIDNEY STONES: Primary | ICD-10-CM

## 2019-04-22 LAB
BILIRUBIN URINE: NEGATIVE
BLOOD URINE, POC: ABNORMAL
CHARACTER, URINE: CLEAR
COLOR, URINE: YELLOW
GLUCOSE URINE: NEGATIVE MG/DL
KETONES, URINE: NEGATIVE
LEUKOCYTE CLUMPS, URINE: NEGATIVE
NITRITE, URINE: NEGATIVE
PH, URINE: 6 (ref 5–9)
PROTEIN, URINE: ABNORMAL MG/DL
SPECIFIC GRAVITY, URINE: 1.02 (ref 1–1.03)
UROBILINOGEN, URINE: 0.2 EU/DL (ref 0–1)

## 2019-04-22 PROCEDURE — 99244 OFF/OP CNSLTJ NEW/EST MOD 40: CPT | Performed by: UROLOGY

## 2019-04-22 PROCEDURE — 81003 URINALYSIS AUTO W/O SCOPE: CPT | Performed by: UROLOGY

## 2019-04-22 PROCEDURE — G8427 DOCREV CUR MEDS BY ELIG CLIN: HCPCS | Performed by: UROLOGY

## 2019-04-22 PROCEDURE — G8420 CALC BMI NORM PARAMETERS: HCPCS | Performed by: UROLOGY

## 2019-04-22 NOTE — TELEPHONE ENCOUNTER
Referral placed, their office will contact patient to schedule. Patient previously notified of this.

## 2019-04-22 NOTE — TELEPHONE ENCOUNTER
Has she been seen here for it? We usually refer to ENT if symptoms are severe, refractory to treatment, or hearing is affected.   CAROLYN

## 2019-04-22 NOTE — PROGRESS NOTES
Ms. Shanice Mitchell was seen today in consultation for kidney stones and a renal cyst.  She recently had an abdominal and renal ultrasound and this demonstrated stones and a cyst.  She has no symptoms. She admits that she does not drink much water. This is a new problem and she has no related symptoms. History reviewed. No pertinent past medical history. Past Surgical History:   Procedure Laterality Date    WISDOM TOOTH EXTRACTION         Current Outpatient Medications on File Prior to Visit   Medication Sig Dispense Refill    Probiotic Product (PROBIOTIC DAILY PO) Take by mouth daily      Biotin w/ Vitamins C & E (HAIR/SKIN/NAILS PO) Take by mouth      Multiple Vitamins-Minerals (MULTIVITAMIN ADULT PO) Take by mouth      Omega-3 Fatty Acids (OMEGA 3 500 PO) Take by mouth       No current facility-administered medications on file prior to visit.         No Known Allergies    Family History   Problem Relation Age of Onset    No Known Problems Mother        Social History     Socioeconomic History    Marital status: Single     Spouse name: Not on file    Number of children: Not on file    Years of education: Not on file    Highest education level: Not on file   Occupational History    Not on file   Social Needs    Financial resource strain: Not on file    Food insecurity:     Worry: Not on file     Inability: Not on file    Transportation needs:     Medical: Not on file     Non-medical: Not on file   Tobacco Use    Smoking status: Never Smoker    Smokeless tobacco: Never Used   Substance and Sexual Activity    Alcohol use: Yes     Comment: rare    Drug use: No    Sexual activity: Not on file   Lifestyle    Physical activity:     Days per week: Not on file     Minutes per session: Not on file    Stress: Not on file   Relationships    Social connections:     Talks on phone: Not on file     Gets together: Not on file     Attends Confucianist service: Not on file     Active member of club or

## 2019-04-22 NOTE — TELEPHONE ENCOUNTER
Patient went to  over the weekend for right ear infection. States that she has been having ongoing issues and was told that she would need referred to ENT if it continued. (Had issues back in December also)  She is on ATB currently with no benefit. Hearing is affected because ear is swollen. Please advise.

## 2019-05-03 ENCOUNTER — HOSPITAL ENCOUNTER (OUTPATIENT)
Dept: CT IMAGING | Age: 64
Discharge: HOME | End: 2019-05-03
Payer: COMMERCIAL

## 2019-05-03 DIAGNOSIS — N20.0 KIDNEY STONES: ICD-10-CM

## 2019-05-03 LAB — POC CREATININE WHOLE BLOOD: 0.7 MG/DL (ref 0.5–1.2)

## 2019-05-03 PROCEDURE — 74178 CT ABD&PLV WO CNTR FLWD CNTR: CPT

## 2019-05-03 PROCEDURE — 6360000004 HC RX CONTRAST MEDICATION: Performed by: UROLOGY

## 2019-05-03 PROCEDURE — 82565 ASSAY OF CREATININE: CPT

## 2019-05-03 RX ADMIN — IOPAMIDOL 85 ML: 755 INJECTION, SOLUTION INTRAVENOUS at 07:26

## 2019-05-06 ENCOUNTER — PROCEDURE VISIT (OUTPATIENT)
Dept: UROLOGY | Age: 64
End: 2019-05-06
Payer: COMMERCIAL

## 2019-05-06 VITALS
DIASTOLIC BLOOD PRESSURE: 68 MMHG | BODY MASS INDEX: 19.29 KG/M2 | WEIGHT: 120 LBS | SYSTOLIC BLOOD PRESSURE: 110 MMHG | HEIGHT: 66 IN

## 2019-05-06 DIAGNOSIS — N20.0 KIDNEY STONES: Primary | ICD-10-CM

## 2019-05-06 DIAGNOSIS — N28.1 RENAL CYST: ICD-10-CM

## 2019-05-06 LAB
BILIRUBIN URINE: NEGATIVE
BLOOD URINE, POC: NEGATIVE
CHARACTER, URINE: CLEAR
COLOR, URINE: YELLOW
GLUCOSE URINE: NEGATIVE MG/DL
KETONES, URINE: NEGATIVE
LEUKOCYTE CLUMPS, URINE: NEGATIVE
NITRITE, URINE: NEGATIVE
PH, URINE: 5.5 (ref 5–9)
PROTEIN, URINE: NEGATIVE MG/DL
SPECIFIC GRAVITY, URINE: 1.02 (ref 1–1.03)
UROBILINOGEN, URINE: 0.2 EU/DL (ref 0–1)

## 2019-05-06 PROCEDURE — 81003 URINALYSIS AUTO W/O SCOPE: CPT | Performed by: UROLOGY

## 2019-05-06 PROCEDURE — 99213 OFFICE O/P EST LOW 20 MIN: CPT | Performed by: UROLOGY

## 2019-05-06 PROCEDURE — G8420 CALC BMI NORM PARAMETERS: HCPCS | Performed by: UROLOGY

## 2019-05-06 PROCEDURE — 3017F COLORECTAL CA SCREEN DOC REV: CPT | Performed by: UROLOGY

## 2019-05-06 PROCEDURE — G8427 DOCREV CUR MEDS BY ELIG CLIN: HCPCS | Performed by: UROLOGY

## 2019-05-06 PROCEDURE — 1036F TOBACCO NON-USER: CPT | Performed by: UROLOGY

## 2019-05-17 ENCOUNTER — OFFICE VISIT (OUTPATIENT)
Dept: ENT CLINIC | Age: 64
End: 2019-05-17
Payer: COMMERCIAL

## 2019-05-17 VITALS
WEIGHT: 117.3 LBS | DIASTOLIC BLOOD PRESSURE: 80 MMHG | RESPIRATION RATE: 14 BRPM | HEIGHT: 66 IN | BODY MASS INDEX: 18.85 KG/M2 | SYSTOLIC BLOOD PRESSURE: 104 MMHG | HEART RATE: 82 BPM | TEMPERATURE: 97.6 F

## 2019-05-17 DIAGNOSIS — H60.61 CHRONIC OTITIS EXTERNA OF RIGHT EAR, UNSPECIFIED TYPE: Primary | ICD-10-CM

## 2019-05-17 DIAGNOSIS — H61.21 IMPACTED CERUMEN OF RIGHT EAR: ICD-10-CM

## 2019-05-17 PROCEDURE — G8420 CALC BMI NORM PARAMETERS: HCPCS | Performed by: OTOLARYNGOLOGY

## 2019-05-17 PROCEDURE — 1036F TOBACCO NON-USER: CPT | Performed by: OTOLARYNGOLOGY

## 2019-05-17 PROCEDURE — 99213 OFFICE O/P EST LOW 20 MIN: CPT | Performed by: OTOLARYNGOLOGY

## 2019-05-17 PROCEDURE — 69210 REMOVE IMPACTED EAR WAX UNI: CPT | Performed by: OTOLARYNGOLOGY

## 2019-05-17 PROCEDURE — 3017F COLORECTAL CA SCREEN DOC REV: CPT | Performed by: OTOLARYNGOLOGY

## 2019-05-17 PROCEDURE — G8427 DOCREV CUR MEDS BY ELIG CLIN: HCPCS | Performed by: OTOLARYNGOLOGY

## 2019-05-17 RX ORDER — CIPROFLOXACIN AND DEXAMETHASONE 3; 1 MG/ML; MG/ML
4 SUSPENSION/ DROPS AURICULAR (OTIC) 2 TIMES DAILY
Qty: 1 BOTTLE | Refills: 1 | Status: SHIPPED | OUTPATIENT
Start: 2019-05-17 | End: 2019-05-24

## 2019-05-17 ASSESSMENT — ENCOUNTER SYMPTOMS
SINUS PAIN: 0
COUGH: 0
EYE ITCHING: 0
STRIDOR: 0
FACIAL SWELLING: 0
SHORTNESS OF BREATH: 0
VOICE CHANGE: 0
NAUSEA: 0
RHINORRHEA: 0
VOMITING: 0
TROUBLE SWALLOWING: 0
SORE THROAT: 0
COLOR CHANGE: 0
SINUS PRESSURE: 0

## 2019-05-17 NOTE — PROGRESS NOTES
[address] [place] [facility]  [address] [address]  Dept: [phone]  Dept Fax: [phone]  Loc: [phone]    Harvinder Preston is a 60 y.o. female who was referred Dina Briceno MD for:  Chief Complaint   Patient presents with    New Patient     New Pt. presents for acute otitis media of the R ear. [facility] HPI:     Harvinder Preston is a 60 y.o. female who presents today for evaluation of right otitis externa which has been going on since at least December. She had tried eardrops as well as oral antibiotics without relief. She has a history of recurrent otitis externa when she was a child and had not had many issues and until now. Her symptoms include intermittent otalgia, feeling like there is deep drainage in her ear, intermittent hearing loss when the ear plugs up, and itchiness of her ear canals. She doesn't really note decreased hearing other than when she feels her ear canal swells up. No recent upper respiratory infection. She also has a history of stable tinnitus which has not changed. [facility] History:     No Known Allergies  Current Outpatient Medications   Medication Sig Dispense Refill    ciprofloxacin-dexamethasone (CIPRODEX) 0.3-0.1 % otic suspension Place 4 drops into the right ear 2 times daily for 7 days Rebate: RxBIN: [id number] RxPCN: [employer] RxGrp: [id number] Issuer: ([id number]) #[id number]  Exp. 9/30/17 1 Bottle 1    Probiotic Product (PROBIOTIC DAILY PO) Take by mouth daily      Biotin w/ Vitamins C & E (HAIR/SKIN/NAILS PO) Take by mouth      Multiple Vitamins-Minerals (MULTIVITAMIN ADULT PO) Take by mouth      Omega-3 Fatty Acids (OMEGA 3 500 PO) Take by mouth       No current facility-administered medications for this visit. History reviewed. No pertinent past medical history.    Past Surgical History:   Procedure Laterality Date    WISDOM TOOTH EXTRACTION       Family History   Problem Relation Age of Onset    No Known Problems Mother      Social History     Tobacco Use    Smoking status: Never Smoker    Smokeless tobacco: Never Used   Substance Use Topics    Alcohol use: Yes     Comment: rare       Subjective:      Review of Systems   Constitutional: Negative. HENT: Positive for congestion, ear discharge, ear pain and tinnitus. Negative for facial swelling, hearing loss, nosebleeds, postnasal drip, rhinorrhea, sinus pressure, sinus pain, sore throat, trouble swallowing and voice change. Eyes: Negative for itching and visual disturbance. Respiratory: Negative for cough, shortness of breath and stridor. Gastrointestinal: Negative for nausea and vomiting. Musculoskeletal: Negative for neck pain and neck stiffness. Skin: Negative for color change and wound. Allergic/Immunologic: Negative for environmental allergies and immunocompromised state. Neurological: Negative for dizziness and facial asymmetry. Psychiatric/Behavioral: Negative for behavioral problems and hallucinations. All other systems reviewed and are negative. Rest of review of systems are negative, except as noted in HPI. Objective:   /80 (Site: Right Upper Arm, Position: Sitting)   Pulse 82   Temp 97.6 °F (36.4 °C) (Oral)   Resp 14   Ht 5' 6\" (1.676 m)   Wt 117 lb 4.8 oz (53.2 kg)   LMP 11/16/2017   BMI 18.93 kg/m²     PHYSICAL EXAM  Constitutional: He is oriented to person, place, and time. He appears well-developed and well-nourished. No distress. HENT:   Head: Normocephalic and atraumatic. Right Ear: External ear normal. Ear canal stenotic, inflamed canal skin and flakiness of the lateral canal.  There is cerumen and debris which was removed. Tympanic membrane intact. Middle ear aerated. Left Ear: External ear normal. Ear canal stenotic, flakiness of the lateral canal. Tympanic membrane intact. Middle ear aerated.     Nose: External nose normal.  Nasal mucosa normal.  No lesions noted.  Mouth/Throat: Fair dentition. Oral cavity mucosa normal, no masses or lesions noted. Oropharynx is clear and moist.   Eyes: Pupils are equal, round, and reactive to light. Conjunctivae and EOM are normal.   Neck: Normal range of motion. Neck supple. No JVD present. No tracheal deviation present. No thyromegaly present. No cervical lymphadenopathy noted. Cardiovascular: Normal rate. Pulmonary/Chest: Effort normal. No stridor or stertor. No respiratory distress. Musculoskeletal: Normal range of motion. He exhibits no edema or Lymphadenopathy. Neurological: He is alert and oriented to person, place, and time. Cranial nerve II-XII grossly intact. Skin: Skin is warm. No erythema. Psychiatric: Normal mood and affect. Behavior is normal.   Vitals reviewed. Data:  All of the past medical history, past surgical history, family history,social history, allergies and current medications were reviewed with the patient. Cerumen removal using operating microscope, RIGHT  Under the operating microscope, the right ear was cleaned with wire loop, forceps and suction as needed. Assessment & Plan   Diagnoses and all orders for this visit:     Diagnosis Orders   1. Chronic otitis externa of right ear, unspecified type     2. Impacted cerumen of right ear       72-year-old female with chronic right-sided otitis externa. She has stenotic ear canals and therefore needed removal of cerumen and debridement of her ear canal to properly heal.  This was performed in the office. We will place her on eardrops for 7 days. She should follow up in 2 weeks. I also discussed placing Coconut oil in her lateral ear canals as needed for itchiness. The findings were explained and her questions were answered. Return in about 2 weeks (around 5/31/2019) for Evaluation of right OE. Mario Alva MD    **This report has been created using voice recognition software.  It may contain minor errors which are inherent in voice recognition technology. **

## 2019-05-30 ENCOUNTER — OFFICE VISIT (OUTPATIENT)
Dept: ENT CLINIC | Age: 64
End: 2019-05-30
Payer: COMMERCIAL

## 2019-05-30 VITALS
BODY MASS INDEX: 18.99 KG/M2 | HEART RATE: 80 BPM | WEIGHT: 118.2 LBS | TEMPERATURE: 98.3 F | HEIGHT: 66 IN | DIASTOLIC BLOOD PRESSURE: 64 MMHG | SYSTOLIC BLOOD PRESSURE: 112 MMHG | RESPIRATION RATE: 12 BRPM

## 2019-05-30 DIAGNOSIS — H60.61 CHRONIC OTITIS EXTERNA OF RIGHT EAR, UNSPECIFIED TYPE: Primary | ICD-10-CM

## 2019-05-30 PROCEDURE — 3017F COLORECTAL CA SCREEN DOC REV: CPT | Performed by: OTOLARYNGOLOGY

## 2019-05-30 PROCEDURE — G8428 CUR MEDS NOT DOCUMENT: HCPCS | Performed by: OTOLARYNGOLOGY

## 2019-05-30 PROCEDURE — 99213 OFFICE O/P EST LOW 20 MIN: CPT | Performed by: OTOLARYNGOLOGY

## 2019-05-30 PROCEDURE — G8420 CALC BMI NORM PARAMETERS: HCPCS | Performed by: OTOLARYNGOLOGY

## 2019-05-30 PROCEDURE — 1036F TOBACCO NON-USER: CPT | Performed by: OTOLARYNGOLOGY

## 2019-05-30 RX ORDER — FLUOCINOLONE ACETONIDE 0.25 MG/G
OINTMENT TOPICAL
Qty: 1 TUBE | Refills: 1 | Status: SHIPPED | OUTPATIENT
Start: 2019-05-30 | End: 2020-06-08

## 2019-05-30 ASSESSMENT — ENCOUNTER SYMPTOMS
COLOR CHANGE: 0
SORE THROAT: 0
EYE ITCHING: 0
VOICE CHANGE: 0
COUGH: 0
TROUBLE SWALLOWING: 0
SHORTNESS OF BREATH: 0
FACIAL SWELLING: 0
NAUSEA: 0
SINUS PAIN: 0
VOMITING: 0
SINUS PRESSURE: 0
RHINORRHEA: 0
STRIDOR: 0

## 2019-05-30 NOTE — PATIENT INSTRUCTIONS
-Use coconut oil twice daily, may apply with cotton swab  -Use fluocinolone ointment if above does not work  -limit ear phone use during flare up

## 2019-05-30 NOTE — PROGRESS NOTES
[address], NOSE AND THROAT  [facility] 84  Irlanda Johnson [address] [address]  Dept: [phone]  Dept Fax: [phone]  Loc: Kwame Arrieta is a 60 y.o. female who was referred byNo ref. provider found for:  Chief Complaint   Patient presents with    2 Week Follow-Up     Patient here for 2 week Follow-up   . HPI:   INITIAL HPI:  Ignacia Nelson is a 60 y.o. female who presents today for evaluation of right otitis externa which has been going on since at least December. She had tried eardrops as well as oral antibiotics without relief. She has a history of recurrent otitis externa when she was a child and had not had many issues and until now. Her symptoms include intermittent otalgia, feeling like there is deep drainage in her ear, intermittent hearing loss when the ear plugs up, and itchiness of her ear canals. She doesn't really note decreased hearing other than when she feels her ear canal swells up. No recent upper respiratory infection. She also has a history of stable tinnitus which has not changed. .    INTERVAL HPI:  Her ear feels better after using eardrops however just yesterday she started feeling itchiness and fullness of her right ear again. Otherwise no new issues    History:     No Known Allergies  Current Outpatient Medications   Medication Sig Dispense Refill    fluocinolone (SYNALAR) 0.025 % ointment Apply topically 2 times daily. 1 Tube 1    Probiotic Product (PROBIOTIC DAILY PO) Take by mouth daily      Biotin w/ Vitamins C & E (HAIR/SKIN/NAILS PO) Take by mouth      Multiple Vitamins-Minerals (MULTIVITAMIN ADULT PO) Take by mouth      Omega-3 Fatty Acids (OMEGA 3 500 PO) Take by mouth       No current facility-administered medications for this visit. History reviewed. No pertinent past medical history.    Past Surgical History:   Procedure Laterality Date    WISDOM TOOTH EXTRACTION       Family History Problem Relation Age of Onset    No Known Problems Mother      Social History     Tobacco Use    Smoking status: Never Smoker    Smokeless tobacco: Never Used   Substance Use Topics    Alcohol use: Yes     Comment: rare       Subjective:      Review of Systems   Constitutional: Negative. HENT: Positive for congestion, ear discharge, ear pain and tinnitus. Negative for facial swelling, hearing loss, nosebleeds, postnasal drip, rhinorrhea, sinus pressure, sinus pain, sore throat, trouble swallowing and voice change. Eyes: Negative for itching and visual disturbance. Respiratory: Negative for cough, shortness of breath and stridor. Gastrointestinal: Negative for nausea and vomiting. Musculoskeletal: Negative for neck pain and neck stiffness. Skin: Negative for color change and wound. Allergic/Immunologic: Negative for environmental allergies and immunocompromised state. Neurological: Negative for dizziness and facial asymmetry. Psychiatric/Behavioral: Negative for behavioral problems and hallucinations. All other systems reviewed and are negative. Rest of review of systems are negative, except as noted in HPI. Objective:   /64 (Site: Right Upper Arm, Position: Sitting, Cuff Size: Medium Adult)   Pulse 80   Temp 98.3 °F (36.8 °C) (Oral)   Resp 12   Ht 5' 6\" (1.676 m)   Wt 118 lb 3.2 oz (53.6 kg)   LMP 11/16/2017   BMI 19.08 kg/m²     PHYSICAL EXAM  Constitutional: He is oriented to person, place, and time. He appears well-developed and well-nourished. No distress. HENT:   Head: Normocephalic and atraumatic. Right Ear: External ear normal. Ear canal with flakiness, very mild superficial excoriation superiorly, which is likely from her itchy it. Tympanic membrane intact. Middle ear aerated. Left Ear: External ear normal. Ear canal clear. Tympanic membrane intact. Middle ear aerated.     Nose: External nose normal.  Nasal mucosa normal.  No lesions noted.  Mouth/Throat: Fair dentition. Oral cavity mucosa normal, no masses or lesions noted. Oropharynx is clear and moist.   Eyes: Pupils are equal, round, and reactive to light. Conjunctivae and EOM are normal.   Neck: Normal range of motion. Neck supple. No JVD present. No tracheal deviation present. No thyromegaly present. No cervical lymphadenopathy noted. Cardiovascular: Normal rate. Pulmonary/Chest: Effort normal. No stridor or stertor. No respiratory distress. Musculoskeletal: Normal range of motion. He exhibits no edema or Lymphadenopathy. Neurological: He is alert and oriented to person, place, and time. Cranial nerve II-XII grossly intact. Skin: Skin is warm. No erythema. Psychiatric: Normal mood and affect. Behavior is normal.   Vitals reviewed. Data:  All of the past medical history, past surgical history, family history,social history, allergies and current medications were reviewed with the patient. Assessment & Plan   Diagnoses and all orders for this visit:     Diagnosis Orders   1. Chronic otitis externa of right ear, unspecified type     60-year-old female with resolved otitis externa. She continues to have eczematous ear canal skin which is itchy and flaky. I discussed using coconut oil twice daily for this. I will also give her a Rx for derm otic and she can fill this if her coconut oil does not work. I also discussed leaving the ear alone and also limiting her earphone use which is likely contributing to her issues. When she flares up she should use her mineral oil as discussed. F/u as needed. The findings were explained and her questions were answered. Return if symptoms worsen or fail to improve, for To See Crystal as needed. Manuel Felder MD    **This report has been created using voice recognition software. It may contain minor errors which are inherent in voice recognition technology. **

## 2019-06-10 NOTE — PROGRESS NOTES
Ms. Delmar Gr was seen in follow up for hematuria. She's had microscopic hematuria intermittently. We had ordered a CT urogram and she is here today to review the results. She does not smoke cigarettes. She never has smoked cigarettes. He has no pain. She's not had any gross hematuria. She has no other symptoms. No past medical history on file. Past Surgical History:   Procedure Laterality Date    WISDOM TOOTH EXTRACTION         Current Outpatient Medications on File Prior to Visit   Medication Sig Dispense Refill    Probiotic Product (PROBIOTIC DAILY PO) Take by mouth daily      Biotin w/ Vitamins C & E (HAIR/SKIN/NAILS PO) Take by mouth      Multiple Vitamins-Minerals (MULTIVITAMIN ADULT PO) Take by mouth      Omega-3 Fatty Acids (OMEGA 3 500 PO) Take by mouth       No current facility-administered medications on file prior to visit.         No Known Allergies    Family History   Problem Relation Age of Onset    No Known Problems Mother        Social History     Socioeconomic History    Marital status: Single     Spouse name: Not on file    Number of children: Not on file    Years of education: Not on file    Highest education level: Not on file   Occupational History    Not on file   Social Needs    Financial resource strain: Not on file    Food insecurity:     Worry: Not on file     Inability: Not on file    Transportation needs:     Medical: Not on file     Non-medical: Not on file   Tobacco Use    Smoking status: Never Smoker    Smokeless tobacco: Never Used   Substance and Sexual Activity    Alcohol use: Yes     Comment: rare    Drug use: No    Sexual activity: Not on file   Lifestyle    Physical activity:     Days per week: Not on file     Minutes per session: Not on file    Stress: Not on file   Relationships    Social connections:     Talks on phone: Not on file     Gets together: Not on file     Attends Yazdanism service: Not on file     Active member of club or organization: Not on file     Attends meetings of clubs or organizations: Not on file     Relationship status: Not on file    Intimate partner violence:     Fear of current or ex partner: Not on file     Emotionally abused: Not on file     Physically abused: Not on file     Forced sexual activity: Not on file   Other Topics Concern    Not on file   Social History Narrative    Not on file       Review of Systems  No problems with ears, nose or throat. No problems with eyes. No chest pain, shortness of breath, abdominal pain, extremity pain or weakness, and no neurological deficits. No rashes. No swollen glands or lymph nodes.  symptoms per HPI. The remainder of the review of symptoms is negative. Exam  Constitutional: oriented to person, place, and time. Vital signs are normal. appears well-developed and well-nourished. cooperative. No distress. HENT:    Head: Normocephalic and atraumatic.    Mouth/Throat: Oropharynx is clear and moist and mucous membranes are normal. No oropharyngeal exudate. Eyes: EOM are normal. Pupils are equal, round, and reactive to light. Right eye exhibits no discharge. Left eye exhibits no discharge. No scleral icterus. Neck: Trachea normal. No JVD present. No tracheal deviation present. Cardiovascular: Normal rate and regular rhythm. Pulmonary/Chest: Effort normal. No respiratory distress. no wheezes. Abdominal: Soft. exhibits no distension. There is no tenderness. There is no rebound and no CVA tenderness. Musculoskeletal: no edema or tenderness. Lymphadenopathy:   Right: No supraclavicular adenopathy present. Left: No supraclavicular adenopathy present. Neurological: alert and oriented to person, place, and time. No cranial nerve deficit. Skin: Skin is warm and dry. not diaphoretic. Psychiatric: normal mood and affect. behavior is normal.   Nursing note and vitals reviewed.     Labs    Results for POC orders placed in visit on 05/06/19   POCT

## 2020-02-07 ENCOUNTER — HOSPITAL ENCOUNTER (OUTPATIENT)
Dept: WOMENS IMAGING | Age: 65
Discharge: HOME | End: 2020-02-07
Payer: COMMERCIAL

## 2020-02-07 PROCEDURE — 77063 BREAST TOMOSYNTHESIS BI: CPT

## 2020-05-27 ENCOUNTER — HOSPITAL ENCOUNTER (OUTPATIENT)
Dept: ULTRASOUND IMAGING | Age: 65
Discharge: HOME | End: 2020-05-27
Payer: COMMERCIAL

## 2020-05-27 PROCEDURE — 76770 US EXAM ABDO BACK WALL COMP: CPT

## 2020-06-08 ENCOUNTER — TELEPHONE (OUTPATIENT)
Dept: UROLOGY | Age: 65
End: 2020-06-08

## 2020-06-08 ENCOUNTER — OFFICE VISIT (OUTPATIENT)
Dept: UROLOGY | Age: 65
End: 2020-06-08
Payer: COMMERCIAL

## 2020-06-08 VITALS — BODY MASS INDEX: 18.96 KG/M2 | HEIGHT: 66 IN | TEMPERATURE: 97.8 F | WEIGHT: 118 LBS

## 2020-06-08 LAB
BILIRUBIN URINE: NEGATIVE
BLOOD URINE, POC: NORMAL
CHARACTER, URINE: CLEAR
COLOR, URINE: YELLOW
GLUCOSE URINE: NEGATIVE MG/DL
KETONES, URINE: NEGATIVE
LEUKOCYTE CLUMPS, URINE: NEGATIVE
NITRITE, URINE: NEGATIVE
PH, URINE: 5.5 (ref 5–9)
PROTEIN, URINE: NEGATIVE MG/DL
SPECIFIC GRAVITY, URINE: 1.02 (ref 1–1.03)
UROBILINOGEN, URINE: 0.2 EU/DL (ref 0–1)

## 2020-06-08 PROCEDURE — G8427 DOCREV CUR MEDS BY ELIG CLIN: HCPCS | Performed by: UROLOGY

## 2020-06-08 PROCEDURE — 99214 OFFICE O/P EST MOD 30 MIN: CPT | Performed by: UROLOGY

## 2020-06-08 PROCEDURE — 1036F TOBACCO NON-USER: CPT | Performed by: UROLOGY

## 2020-06-08 PROCEDURE — 3017F COLORECTAL CA SCREEN DOC REV: CPT | Performed by: UROLOGY

## 2020-06-08 PROCEDURE — 81003 URINALYSIS AUTO W/O SCOPE: CPT | Performed by: UROLOGY

## 2020-06-08 PROCEDURE — G8420 CALC BMI NORM PARAMETERS: HCPCS | Performed by: UROLOGY

## 2020-06-08 NOTE — PROGRESS NOTES
Margie Lopez MD  Urology Clinic Progress Note      Patient:  Letty Pandya  YOB: 1955  Date: 6/8/2020    HISTORY OF PRESENT ILLNESS:   The patient is a 63 y.o. female who presents today for follow-up for the following problem(s):      1. Kidney stones    2. Renal cyst    3. Asymptomatic microscopic hematuria         Overall the problem(s) : are worsening. Associated Symptoms: No dysuria, gross hematuria. Pain Severity:      Summary of old records: Previously had microscopic hematuria work-up with CT scan which was negative. 5/2019  1. Nonobstructive nephrolithiasis bilaterally. 2. Left-sided renal cysts. 3. Moderate retained stool. There is a 6 mm hyperdense stone at the interpolar region of the right kidney and a punctate 2 mm hyperdense stone at the inferior pole of the right kidney. There are multiple    small hyperdense stones in the left kidney the largest a 2 mm stone at the inferior pole. There is a 6 mm hypodense cyst at the superior pole of the left kidney. There is another 6 mm hypodense cyst at the superior pole more inferiorly. There is a 1 cm    hypodense cyst at the interpolar region of the left kidney. Additional History: Onset 1 to 2 years  Severity is described as mild-moderate. The course of symptoms over time is chronic. Alleviating factors: none  Worsening factors: none  Lower urinary tract symptoms: None  No flank pains  No hematuria  No UTIs  Here for US and UA review    I independently reviewed and verified the images and reports from:    Us Renal Complete    Result Date: 5/27/2020  PROCEDURE: US RENAL COMPLETE CLINICAL INFORMATION: Renal cyst . COMPARISON: No prior study. TECHNIQUE: Grayscale and color Doppler ultrasound FINDINGS: Right: Right kidney is normal in size and echogenicity. There is a 0.7 x 0.6 cm interpolar echogenic focus with posterior shadowing compatible with a calculus. Right renal pelvis measures 1.3 cm.  There is mild right renal cortical thinning. The resistive index is normal. Left: There are 2 separate echogenic foci within the left kidney 0.4 cm x 0.3 cm and 0.4 cm x 0.2 cm. Also and interpolar 1.2 cm x 0.8 cm sonolucent structure with posterior enhancement. No renal cortical thinning. No hydronephrosis. Resistive index is normal. RIGHT KIDNEY - 10.8 x 5.3 x 3.8 cm Resistive Index - 0.58 Cortical Thickness - 0.83 cm LEFT KIDNEY - 10.0 x 4.2 x 4.7 cm Resistive Index - 0.54 Cortical Thickness - 1.3 cm URINARY BLADDER Pre-Void - 51.3 mL Post-Void - 4.74 mL     Bilateral nonobstructive nephrolithiasis. Suggestion of right renal cortical thinning. Otherwise stable exam. **This report has been created using voice recognition software. It may contain minor errors which are inherent in voice recognition technology. ** Final report electronically signed by Dr. Olman Nichols on 5/27/2020 8:54 AM      Imaging Reviewed during this Office Visit:   (results were independently reviewed by physician and radiology report verified)    Urinalysis today:  Results for POC orders placed in visit on 06/08/20   POCT Urinalysis No Micro (Auto)   Result Value Ref Range    Glucose, Ur Negative NEGATIVE mg/dl    Bilirubin Urine Negative     Ketones, Urine Negative NEGATIVE    Specific Gravity, Urine 1.020 1.002 - 1.03    Blood, UA POC Trace-intact NEGATIVE    pH, Urine 5.50 5.0 - 9.0    Protein, Urine Negative NEGATIVE mg/dl    Urobilinogen, Urine 0.20 0.0 - 1.0 eu/dl    Nitrite, Urine Negative NEGATIVE    Leukocyte Clumps, Urine Negative NEGATIVE    Color, Urine Yellow YELLOW-STR    Character, Urine Clear CLR-SL.ADAIR       Last BUN and creatinine:  No results found for: BUN  No results found for: CREATININE    PAST MEDICAL, FAMILY AND SOCIAL HISTORY UPDATE:  Past Medical History:   Diagnosis Date    Kidney stones      Past Surgical History:   Procedure Laterality Date    WISDOM TOOTH EXTRACTION       Family History   Problem Relation Age of Onset    No Known Problems Mother      No outpatient medications have been marked as taking for the 6/8/20 encounter (Office Visit) with Melina Rodriguez MD.       Patient has no known allergies. Social History     Tobacco Use   Smoking Status Never Smoker   Smokeless Tobacco Never Used       Social History     Substance and Sexual Activity   Alcohol Use Yes    Comment: rare       REVIEW OF SYSTEMS:  Constitutional: negative  Eyes: negative  Respiratory: negative  Cardiovascular: negative  Gastrointestinal: negative  Genitourinary: negative except for what is in HPI  Musculoskeletal: negative  Skin: negative   Neurological: negative  Hematological/Lymphatic: negative  Psychological: negative    Physical Exam:      Vitals:    06/08/20 1409   Temp: 97.8 °F (36.6 °C)     Patient is a 63 y.o. female in no acute distress and alert and oriented to person, place and time. NAD, Alert  Non labored respiration  Normal peripheral pulses  Soft, non tender  Skin-warm and dry  Psych- normal mood and affect    Assessment and Plan      1. Kidney stones    2. Renal cyst    3. Asymptomatic microscopic hematuria           Plan:       Renal cyst: Unchanged and stable  Bilateral kidney stones: Nonobstructive and asymptomatic  Patient instructed to drink at least 10 eight ounce glasses of water a day and avoid excessive consumption of sodium and animal protein to decrease risk of recurrent kidney stones. Microscopic hematuria: Urinalysis is normal today. Will repeat in 1 year. Return in about 1 year (around 6/8/2021).              Ana Veras MD  [facility] Urology

## 2020-07-15 ENCOUNTER — APPOINTMENT (OUTPATIENT)
Dept: CT IMAGING | Age: 65
End: 2020-07-15
Payer: COMMERCIAL

## 2020-07-15 ENCOUNTER — HOSPITAL ENCOUNTER (EMERGENCY)
Age: 65
Discharge: HOME | End: 2020-07-15
Payer: COMMERCIAL

## 2020-07-15 VITALS
WEIGHT: 115 LBS | OXYGEN SATURATION: 97 % | RESPIRATION RATE: 16 BRPM | HEIGHT: 66 IN | HEART RATE: 75 BPM | TEMPERATURE: 98.4 F | SYSTOLIC BLOOD PRESSURE: 132 MMHG | BODY MASS INDEX: 18.48 KG/M2 | DIASTOLIC BLOOD PRESSURE: 74 MMHG

## 2020-07-15 LAB
ANION GAP SERPL CALCULATED.3IONS-SCNC: 12 MEQ/L (ref 8–16)
BACTERIA: ABNORMAL
BASOPHILS # BLD: 0.5 %
BASOPHILS ABSOLUTE: 0 THOU/MM3 (ref 0–0.1)
BILIRUBIN URINE: NEGATIVE
BLOOD, URINE: NEGATIVE
BUN BLDV-MCNC: 19 MG/DL (ref 7–22)
CALCIUM SERPL-MCNC: 9.9 MG/DL (ref 8.5–10.5)
CASTS: ABNORMAL /LPF
CASTS: ABNORMAL /LPF
CHARACTER, URINE: CLEAR
CHLORIDE BLD-SCNC: 104 MEQ/L (ref 98–111)
CO2: 25 MEQ/L (ref 23–33)
COLOR: YELLOW
CREAT SERPL-MCNC: 0.8 MG/DL (ref 0.4–1.2)
CRYSTALS: ABNORMAL
EOSINOPHIL # BLD: 2.7 %
EOSINOPHILS ABSOLUTE: 0.2 THOU/MM3 (ref 0–0.4)
EPITHELIAL CELLS, UA: ABNORMAL /HPF
ERYTHROCYTE [DISTWIDTH] IN BLOOD BY AUTOMATED COUNT: 13.1 % (ref 11.5–14.5)
ERYTHROCYTE [DISTWIDTH] IN BLOOD BY AUTOMATED COUNT: 46.9 FL (ref 35–45)
GFR SERPL CREATININE-BSD FRML MDRD: 72 ML/MIN/1.73M2
GLUCOSE BLD-MCNC: 116 MG/DL (ref 70–108)
GLUCOSE, URINE: NEGATIVE MG/DL
HCT VFR BLD CALC: 45.6 % (ref 37–47)
HEMOGLOBIN: 14.5 GM/DL (ref 12–16)
IMMATURE GRANS (ABS): 0.02 THOU/MM3 (ref 0–0.07)
IMMATURE GRANULOCYTES: 0.3 %
KETONES, URINE: ABNORMAL
LEUKOCYTE ESTERASE, URINE: NEGATIVE
LYMPHOCYTES # BLD: 14.3 %
LYMPHOCYTES ABSOLUTE: 1.1 THOU/MM3 (ref 1–4.8)
MCH RBC QN AUTO: 30.9 PG (ref 26–33)
MCHC RBC AUTO-ENTMCNC: 31.8 GM/DL (ref 32.2–35.5)
MCV RBC AUTO: 97 FL (ref 81–99)
MISCELLANEOUS LAB TEST RESULT: ABNORMAL
MONOCYTES # BLD: 6.2 %
MONOCYTES ABSOLUTE: 0.5 THOU/MM3 (ref 0.4–1.3)
NITRITE, URINE: NEGATIVE
NUCLEATED RED BLOOD CELLS: 0 /100 WBC
OSMOLALITY CALCULATION: 284.5 MOSMOL/KG (ref 275–300)
PH UA: >= 9 (ref 5–9)
PLATELET # BLD: 205 THOU/MM3 (ref 130–400)
PMV BLD AUTO: 9.8 FL (ref 9.4–12.4)
POTASSIUM SERPL-SCNC: 4.1 MEQ/L (ref 3.5–5.2)
PROTEIN UA: NEGATIVE MG/DL
RBC # BLD: 4.7 MILL/MM3 (ref 4.2–5.4)
RBC URINE: ABNORMAL /HPF
RENAL EPITHELIAL, UA: ABNORMAL
SEG NEUTROPHILS: 76 %
SEGMENTED NEUTROPHILS ABSOLUTE COUNT: 5.7 THOU/MM3 (ref 1.8–7.7)
SODIUM BLD-SCNC: 141 MEQ/L (ref 135–145)
SPECIFIC GRAVITY UA: 1.01 (ref 1–1.03)
UROBILINOGEN, URINE: 0.2 EU/DL (ref 0–1)
WBC # BLD: 7.5 THOU/MM3 (ref 4.8–10.8)
WBC UA: ABNORMAL /HPF
YEAST: ABNORMAL

## 2020-07-15 PROCEDURE — 87077 CULTURE AEROBIC IDENTIFY: CPT

## 2020-07-15 PROCEDURE — 6360000002 HC RX W HCPCS: Performed by: NURSE PRACTITIONER

## 2020-07-15 PROCEDURE — 99283 EMERGENCY DEPT VISIT LOW MDM: CPT

## 2020-07-15 PROCEDURE — 74176 CT ABD & PELVIS W/O CONTRAST: CPT

## 2020-07-15 PROCEDURE — 87086 URINE CULTURE/COLONY COUNT: CPT

## 2020-07-15 PROCEDURE — 80048 BASIC METABOLIC PNL TOTAL CA: CPT

## 2020-07-15 PROCEDURE — 96376 TX/PRO/DX INJ SAME DRUG ADON: CPT

## 2020-07-15 PROCEDURE — 85025 COMPLETE CBC W/AUTO DIFF WBC: CPT

## 2020-07-15 PROCEDURE — 36415 COLL VENOUS BLD VENIPUNCTURE: CPT

## 2020-07-15 PROCEDURE — 96374 THER/PROPH/DIAG INJ IV PUSH: CPT

## 2020-07-15 PROCEDURE — 6370000000 HC RX 637 (ALT 250 FOR IP): Performed by: NURSE PRACTITIONER

## 2020-07-15 PROCEDURE — 81001 URINALYSIS AUTO W/SCOPE: CPT

## 2020-07-15 PROCEDURE — 96375 TX/PRO/DX INJ NEW DRUG ADDON: CPT

## 2020-07-15 RX ORDER — TAMSULOSIN HYDROCHLORIDE 0.4 MG/1
0.4 CAPSULE ORAL ONCE
Status: COMPLETED | OUTPATIENT
Start: 2020-07-15 | End: 2020-07-15

## 2020-07-15 RX ORDER — KETOROLAC TROMETHAMINE 30 MG/ML
30 INJECTION, SOLUTION INTRAMUSCULAR; INTRAVENOUS ONCE
Status: COMPLETED | OUTPATIENT
Start: 2020-07-15 | End: 2020-07-15

## 2020-07-15 RX ORDER — ONDANSETRON 2 MG/ML
4 INJECTION INTRAMUSCULAR; INTRAVENOUS ONCE
Status: COMPLETED | OUTPATIENT
Start: 2020-07-15 | End: 2020-07-15

## 2020-07-15 RX ORDER — FENTANYL CITRATE 50 UG/ML
50 INJECTION, SOLUTION INTRAMUSCULAR; INTRAVENOUS ONCE
Status: COMPLETED | OUTPATIENT
Start: 2020-07-15 | End: 2020-07-15

## 2020-07-15 RX ORDER — TAMSULOSIN HYDROCHLORIDE 0.4 MG/1
0.4 CAPSULE ORAL DAILY
Qty: 5 CAPSULE | Refills: 0 | Status: SHIPPED | OUTPATIENT
Start: 2020-07-15 | End: 2020-08-06 | Stop reason: ALTCHOICE

## 2020-07-15 RX ORDER — KETOROLAC TROMETHAMINE 10 MG/1
10 TABLET, FILM COATED ORAL EVERY 6 HOURS PRN
Qty: 20 TABLET | Refills: 0 | Status: SHIPPED | OUTPATIENT
Start: 2020-07-15 | End: 2020-08-06 | Stop reason: ALTCHOICE

## 2020-07-15 RX ADMIN — FENTANYL CITRATE 50 MCG: 50 INJECTION, SOLUTION INTRAMUSCULAR; INTRAVENOUS at 08:35

## 2020-07-15 RX ADMIN — TAMSULOSIN HYDROCHLORIDE 0.4 MG: 0.4 CAPSULE ORAL at 09:12

## 2020-07-15 RX ADMIN — FENTANYL CITRATE 50 MCG: 50 INJECTION, SOLUTION INTRAMUSCULAR; INTRAVENOUS at 09:32

## 2020-07-15 RX ADMIN — KETOROLAC TROMETHAMINE 30 MG: 30 INJECTION, SOLUTION INTRAMUSCULAR at 08:36

## 2020-07-15 RX ADMIN — ONDANSETRON 4 MG: 2 INJECTION INTRAMUSCULAR; INTRAVENOUS at 08:35

## 2020-07-15 ASSESSMENT — PAIN DESCRIPTION - LOCATION
LOCATION: FLANK
LOCATION: FLANK

## 2020-07-15 ASSESSMENT — PAIN DESCRIPTION - FREQUENCY
FREQUENCY: CONTINUOUS
FREQUENCY: CONTINUOUS

## 2020-07-15 ASSESSMENT — PAIN SCALES - GENERAL
PAINLEVEL_OUTOF10: 10
PAINLEVEL_OUTOF10: 8
PAINLEVEL_OUTOF10: 0

## 2020-07-15 ASSESSMENT — ENCOUNTER SYMPTOMS
SHORTNESS OF BREATH: 0
EYES NEGATIVE: 1
COLOR CHANGE: 0
ABDOMINAL PAIN: 0
COUGH: 0

## 2020-07-15 ASSESSMENT — PAIN DESCRIPTION - PAIN TYPE
TYPE: ACUTE PAIN
TYPE: ACUTE PAIN

## 2020-07-15 ASSESSMENT — PAIN DESCRIPTION - PROGRESSION: CLINICAL_PROGRESSION: RESOLVED

## 2020-07-15 ASSESSMENT — PAIN DESCRIPTION - DESCRIPTORS
DESCRIPTORS: DULL
DESCRIPTORS: SHOOTING;SHARP

## 2020-07-15 NOTE — ED NOTES
Patient transported to Radiology department via [employer] in stable condition.        Bashir Galicia RN  07/15/20 9084

## 2020-07-15 NOTE — ED PROVIDER NOTES
[facility] Emergency [address] [place] [address]       Chief Complaint   Patient presents with    Flank Pain       Nurses Notes reviewed and I agree except as noted in the HPI. HISTORY OF PRESENT ILLNESS    Nichelle Espinoza dorothy 63 y.o. female who presents to the ED for evaluation of right sided flank pain. Patient states she woke this morning with a feeling of urgency to urinate and feared she had a UTI which she has experienced before. She denies any hematuria. About an hour later she started having right sided flank pain that varies from 8/10-10/10 pain that is constant and stabbing. She has not taken any medications for pain relief. She is more comfortable if she is standing up than sitting down. Patient has no other concerns at this time. HPI was provided by the patient. REVIEW OF SYSTEMS     Review of Systems   Constitutional: Negative for fatigue and fever. HENT: Negative. Eyes: Negative. Respiratory: Negative for cough and shortness of breath. Cardiovascular: Negative for chest pain. Gastrointestinal: Negative for abdominal pain. Genitourinary: Positive for flank pain (right sided) and urgency. Negative for dysuria, frequency and hematuria. Skin: Negative for color change and rash. Neurological: Negative for dizziness and light-headedness. PAST MEDICAL HISTORY     Past Medical History:   Diagnosis Date    Kidney stones        SURGICALHISTORY      has a past surgical history that includes Pinon tooth extraction. CURRENT MEDICATIONS       Discharge Medication List as of 7/15/2020  9:27 AM      CONTINUE these medications which have NOT CHANGED    Details   Multiple Vitamins-Minerals (MULTIVITAMIN ADULT PO) Take by mouthHistorical Med             ALLERGIES     has No Known Allergies. FAMILY HISTORY     She indicated that her mother is alive. She indicated that her father is . family history includes No Known Problems in her mother.     SOCIAL HISTORY Social History     Socioeconomic History    Marital status: Single     Spouse name: Not on file    Number of children: Not on file    Years of education: Not on file    Highest education level: Not on file   Occupational History    Not on file   Social Needs    Financial resource strain: Not on file    Food insecurity     Worry: Not on file     Inability: Not on file    Transportation needs     Medical: Not on file     Non-medical: Not on file   Tobacco Use    Smoking status: Never Smoker    Smokeless tobacco: Never Used   Substance and Sexual Activity    Alcohol use: Yes     Comment: rare    Drug use: No    Sexual activity: Not on file   Lifestyle    Physical activity     Days per week: Not on file     Minutes per session: Not on file    Stress: Not on file   Relationships    Social connections     Talks on phone: Not on file     Gets together: Not on file     Attends Jehovah's witness service: Not on file     Active member of club or organization: Not on file     Attends meetings of clubs or organizations: Not on file     Relationship status: Not on file    Intimate partner violence     Fear of current or ex partner: Not on file     Emotionally abused: Not on file     Physically abused: Not on file     Forced sexual activity: Not on file   Other Topics Concern    Not on file   Social History Narrative    Not on file       PHYSICAL EXAM     INITIAL VITALS:  height is 5' 6\" (1.676 m) and weight is 115 lb (52.2 kg). Her oral temperature is 98.4 °F (36.9 °C). Her blood pressure is 132/74 and her pulse is 75. Her respiration is 16 and oxygen saturation is 97%. Physical Exam  Constitutional:       Appearance: Normal appearance. HENT:      Head: Normocephalic.       Right Ear: External ear normal.      Left Ear: External ear normal.      Nose: Nose normal.      Mouth/Throat:      Mouth: Mucous membranes are moist.   Eyes:      Conjunctiva/sclera: Conjunctivae normal.   Cardiovascular:      Rate and Rhythm: Normal rate and regular rhythm. Heart sounds: Normal heart sounds. Pulmonary:      Effort: Pulmonary effort is normal.      Breath sounds: Normal breath sounds. Abdominal:      General: Abdomen is flat. Palpations: Abdomen is soft. Tenderness: There is no abdominal tenderness. There is no right CVA tenderness or left CVA tenderness. Skin:     General: Skin is warm and dry. Neurological:      Mental Status: She is alert and oriented to person, place, and time. Psychiatric:         Mood and Affect: Mood normal.         Behavior: Behavior normal.         DIFFERENTIAL DIAGNOSIS:   Nephrolithiasis, ureterolithiasis, cholecystitis, cholelithiasis    DIAGNOSTIC RESULTS       RADIOLOGY: non-plainfilm images(s) such as CT, Ultrasound and MRI are read by the radiologist.  Plain radiographic images are visualized and preliminarily interpreted by the emergency physician unless otherwise stated below. CT ABDOMEN PELVIS WO CONTRAST Additional Contrast? None   Final Result       1. 5 mm distal ureterolith at the right ureterovesicular junction with associated moderate hydroureteronephrosis. 2. Additional nonobstructive nephrolithiasis bilaterally. **This report has been created using voice recognition software. It may contain minor errors which are inherent in voice recognition technology. **      Final report electronically signed by Dr. Grady Mir MD on 7/15/2020 9:19 AM            LABS:   Labs Reviewed   BASIC METABOLIC PANEL - Abnormal; Notable for the following components:       Result Value    Glucose 116 (*)     All other components within normal limits   CBC WITH AUTO DIFFERENTIAL - Abnormal; Notable for the following components:    MCHC 31.8 (*)     RDW-SD 46.9 (*)     All other components within normal limits   URINALYSIS WITH MICROSCOPIC - Abnormal; Notable for the following components:    Ketones, Urine TRACE (*)     All other components within normal limits None    CONSULTS:  None    PROCEDURES:  None    FINAL IMPRESSION     1. Ureterolithiasis    2. Renal colic          DISPOSITION/PLAN   Patient discharged in stable condition    PATIENT REFERREDTO:  Luiza Thompson MD  [address] [place] [address]  [phone]    Call   If symptoms worsen      DISCHARGE MEDICATIONS:  Discharge Medication List as of 7/15/2020  9:27 AM      START taking these medications    Details   ketorolac (TORADOL) 10 MG tablet Take 1 tablet by mouth every 6 hours as needed for Pain, Disp-20 tablet,R-0Print      tamsulosin (FLOMAX) 0.4 MG capsule Take 1 capsule by mouth daily for 5 doses, Disp-5 capsule,R-0Print             (Please note that portions of this note were completed with a voice recognition program.  Efforts were made to edit the dictations but occasionally words are mis-transcribed.)    Provider:  I personally performed the services described in the documentation,reviewed and edited the documentation which was dictated to the scribe in my presence, and it accurately records my words and actions.     Reji Grey CNP 07/15/20 6:28 PM    ZAHIRA Pollard - MCKENZIE        [employer], ZAHIRA - CNP  07/15/20 1319

## 2020-07-15 NOTE — ED NOTES
Patient to ED for right sided flank pain. Patient states sudden onset of severe pain started around 0400. Patient has known kidney stones in both kidneys. Patient has renal US last week.  Patient denies fever or chills nausea vomiting diarrhea      Mazariegos League, RN  07/15/20 1652

## 2020-07-15 NOTE — ED NOTES
Patient reports she now has no pain.  No concerns voiced at this time     Helder Quinn RN  07/15/20 5016

## 2020-07-16 ENCOUNTER — TELEPHONE (OUTPATIENT)
Dept: FAMILY MEDICINE CLINIC | Age: 65
End: 2020-07-16

## 2020-07-17 LAB
ORGANISM: ABNORMAL
URINE CULTURE, ROUTINE: ABNORMAL

## 2020-07-18 ENCOUNTER — HOSPITAL ENCOUNTER (INPATIENT)
Age: 65
LOS: 1 days | Discharge: HOME | DRG: 465 | End: 2020-07-20
Attending: EMERGENCY MEDICINE | Admitting: FAMILY MEDICINE
Payer: COMMERCIAL

## 2020-07-18 ENCOUNTER — APPOINTMENT (OUTPATIENT)
Dept: CT IMAGING | Age: 65
DRG: 465 | End: 2020-07-18
Payer: COMMERCIAL

## 2020-07-18 LAB
ALBUMIN SERPL-MCNC: 4.6 G/DL (ref 3.5–5.1)
ALP BLD-CCNC: 61 U/L (ref 38–126)
ALT SERPL-CCNC: 18 U/L (ref 11–66)
ANION GAP SERPL CALCULATED.3IONS-SCNC: 13 MEQ/L (ref 8–16)
AST SERPL-CCNC: 25 U/L (ref 5–40)
BASOPHILS # BLD: 0.6 %
BASOPHILS ABSOLUTE: 0.1 THOU/MM3 (ref 0–0.1)
BILIRUB SERPL-MCNC: 0.3 MG/DL (ref 0.3–1.2)
BILIRUBIN URINE: NEGATIVE
BLOOD, URINE: NEGATIVE
BUN BLDV-MCNC: 23 MG/DL (ref 7–22)
CALCIUM SERPL-MCNC: 9.2 MG/DL (ref 8.5–10.5)
CHARACTER, URINE: CLEAR
CHLORIDE BLD-SCNC: 99 MEQ/L (ref 98–111)
CO2: 24 MEQ/L (ref 23–33)
COLOR: YELLOW
CREAT SERPL-MCNC: 1.3 MG/DL (ref 0.4–1.2)
EOSINOPHIL # BLD: 3.7 %
EOSINOPHILS ABSOLUTE: 0.4 THOU/MM3 (ref 0–0.4)
ERYTHROCYTE [DISTWIDTH] IN BLOOD BY AUTOMATED COUNT: 13.2 % (ref 11.5–14.5)
ERYTHROCYTE [DISTWIDTH] IN BLOOD BY AUTOMATED COUNT: 47.1 FL (ref 35–45)
GFR SERPL CREATININE-BSD FRML MDRD: 41 ML/MIN/1.73M2
GLUCOSE BLD-MCNC: 110 MG/DL (ref 70–108)
GLUCOSE URINE: NEGATIVE MG/DL
HCT VFR BLD CALC: 39.8 % (ref 37–47)
HEMOGLOBIN: 12.9 GM/DL (ref 12–16)
IMMATURE GRANS (ABS): 0.05 THOU/MM3 (ref 0–0.07)
IMMATURE GRANULOCYTES: 0.5 %
KETONES, URINE: ABNORMAL
LEUKOCYTE ESTERASE, URINE: NEGATIVE
LIPASE: 33.8 U/L (ref 5.6–51.3)
LYMPHOCYTES # BLD: 17.9 %
LYMPHOCYTES ABSOLUTE: 1.8 THOU/MM3 (ref 1–4.8)
MCH RBC QN AUTO: 31.4 PG (ref 26–33)
MCHC RBC AUTO-ENTMCNC: 32.4 GM/DL (ref 32.2–35.5)
MCV RBC AUTO: 96.8 FL (ref 81–99)
MONOCYTES # BLD: 8.2 %
MONOCYTES ABSOLUTE: 0.8 THOU/MM3 (ref 0.4–1.3)
NITRITE, URINE: NEGATIVE
NUCLEATED RED BLOOD CELLS: 0 /100 WBC
OSMOLALITY CALCULATION: 276.3 MOSMOL/KG (ref 275–300)
PH UA: 7.5 (ref 5–9)
PLATELET # BLD: 180 THOU/MM3 (ref 130–400)
PMV BLD AUTO: 10.1 FL (ref 9.4–12.4)
POTASSIUM SERPL-SCNC: 4.3 MEQ/L (ref 3.5–5.2)
PROTEIN UA: NEGATIVE
RBC # BLD: 4.11 MILL/MM3 (ref 4.2–5.4)
SEG NEUTROPHILS: 69.1 %
SEGMENTED NEUTROPHILS ABSOLUTE COUNT: 7.1 THOU/MM3 (ref 1.8–7.7)
SODIUM BLD-SCNC: 136 MEQ/L (ref 135–145)
SPECIFIC GRAVITY, URINE: < 1.005 (ref 1–1.03)
TOTAL PROTEIN: 6.8 G/DL (ref 6.1–8)
UROBILINOGEN, URINE: 0.2 EU/DL (ref 0–1)
WBC # BLD: 10.3 THOU/MM3 (ref 4.8–10.8)

## 2020-07-18 PROCEDURE — 83690 ASSAY OF LIPASE: CPT

## 2020-07-18 PROCEDURE — 36415 COLL VENOUS BLD VENIPUNCTURE: CPT

## 2020-07-18 PROCEDURE — 81003 URINALYSIS AUTO W/O SCOPE: CPT

## 2020-07-18 PROCEDURE — 74176 CT ABD & PELVIS W/O CONTRAST: CPT

## 2020-07-18 PROCEDURE — 96375 TX/PRO/DX INJ NEW DRUG ADDON: CPT

## 2020-07-18 PROCEDURE — 99285 EMERGENCY DEPT VISIT HI MDM: CPT

## 2020-07-18 PROCEDURE — 96374 THER/PROPH/DIAG INJ IV PUSH: CPT

## 2020-07-18 PROCEDURE — 80053 COMPREHEN METABOLIC PANEL: CPT

## 2020-07-18 PROCEDURE — 85025 COMPLETE CBC W/AUTO DIFF WBC: CPT

## 2020-07-18 PROCEDURE — 6360000002 HC RX W HCPCS: Performed by: EMERGENCY MEDICINE

## 2020-07-18 RX ORDER — MORPHINE SULFATE 4 MG/ML
4 INJECTION, SOLUTION INTRAMUSCULAR; INTRAVENOUS ONCE
Status: COMPLETED | OUTPATIENT
Start: 2020-07-18 | End: 2020-07-18

## 2020-07-18 RX ORDER — 0.9 % SODIUM CHLORIDE 0.9 %
1000 INTRAVENOUS SOLUTION INTRAVENOUS ONCE
Status: COMPLETED | OUTPATIENT
Start: 2020-07-18 | End: 2020-07-19

## 2020-07-18 RX ORDER — FENTANYL CITRATE 50 UG/ML
50 INJECTION, SOLUTION INTRAMUSCULAR; INTRAVENOUS ONCE
Status: COMPLETED | OUTPATIENT
Start: 2020-07-19 | End: 2020-07-18

## 2020-07-18 RX ORDER — KETOROLAC TROMETHAMINE 30 MG/ML
30 INJECTION, SOLUTION INTRAMUSCULAR; INTRAVENOUS ONCE
Status: COMPLETED | OUTPATIENT
Start: 2020-07-18 | End: 2020-07-18

## 2020-07-18 RX ADMIN — KETOROLAC TROMETHAMINE 30 MG: 30 INJECTION, SOLUTION INTRAMUSCULAR at 23:18

## 2020-07-18 RX ADMIN — MORPHINE SULFATE 4 MG: 4 INJECTION, SOLUTION INTRAMUSCULAR; INTRAVENOUS at 23:17

## 2020-07-18 RX ADMIN — FENTANYL CITRATE 50 MCG: 50 INJECTION, SOLUTION INTRAMUSCULAR; INTRAVENOUS at 23:54

## 2020-07-18 ASSESSMENT — ENCOUNTER SYMPTOMS
VOMITING: 0
COUGH: 0
CONSTIPATION: 0
ABDOMINAL PAIN: 0
EYE REDNESS: 0
SHORTNESS OF BREATH: 0
BACK PAIN: 0
CHEST TIGHTNESS: 0
EYE DISCHARGE: 0
PHOTOPHOBIA: 0
EYE PAIN: 0
DIARRHEA: 0
EYE ITCHING: 0
RHINORRHEA: 0
WHEEZING: 0
SORE THROAT: 0
STRIDOR: 0
NAUSEA: 1
ABDOMINAL DISTENTION: 0

## 2020-07-18 ASSESSMENT — PAIN DESCRIPTION - ORIENTATION: ORIENTATION: RIGHT

## 2020-07-18 ASSESSMENT — PAIN SCALES - GENERAL
PAINLEVEL_OUTOF10: 10
PAINLEVEL_OUTOF10: 10
PAINLEVEL_OUTOF10: 6

## 2020-07-18 ASSESSMENT — PAIN DESCRIPTION - PAIN TYPE
TYPE: ACUTE PAIN
TYPE: ACUTE PAIN

## 2020-07-18 ASSESSMENT — PAIN DESCRIPTION - DESCRIPTORS: DESCRIPTORS: CONSTANT;ACHING;TENDER

## 2020-07-18 ASSESSMENT — PAIN DESCRIPTION - LOCATION: LOCATION: FLANK

## 2020-07-19 ENCOUNTER — ANESTHESIA (OUTPATIENT)
Dept: OPERATING ROOM | Age: 65
DRG: 465 | End: 2020-07-19
Payer: COMMERCIAL

## 2020-07-19 ENCOUNTER — ANESTHESIA EVENT (OUTPATIENT)
Dept: OPERATING ROOM | Age: 65
DRG: 465 | End: 2020-07-19
Payer: COMMERCIAL

## 2020-07-19 VITALS — OXYGEN SATURATION: 100 % | DIASTOLIC BLOOD PRESSURE: 53 MMHG | SYSTOLIC BLOOD PRESSURE: 83 MMHG

## 2020-07-19 PROBLEM — N20.1 URETERAL STONE: Status: ACTIVE | Noted: 2020-07-19

## 2020-07-19 LAB
ANION GAP SERPL CALCULATED.3IONS-SCNC: 13 MEQ/L (ref 8–16)
BASOPHILS # BLD: 0.9 %
BASOPHILS ABSOLUTE: 0.1 THOU/MM3 (ref 0–0.1)
BUN BLDV-MCNC: 19 MG/DL (ref 7–22)
CALCIUM SERPL-MCNC: 8.5 MG/DL (ref 8.5–10.5)
CHLORIDE BLD-SCNC: 110 MEQ/L (ref 98–111)
CO2: 21 MEQ/L (ref 23–33)
CREAT SERPL-MCNC: 1 MG/DL (ref 0.4–1.2)
EOSINOPHIL # BLD: 7.1 %
EOSINOPHILS ABSOLUTE: 0.5 THOU/MM3 (ref 0–0.4)
ERYTHROCYTE [DISTWIDTH] IN BLOOD BY AUTOMATED COUNT: 13.2 % (ref 11.5–14.5)
ERYTHROCYTE [DISTWIDTH] IN BLOOD BY AUTOMATED COUNT: 47.8 FL (ref 35–45)
GFR SERPL CREATININE-BSD FRML MDRD: 56 ML/MIN/1.73M2
GLUCOSE BLD-MCNC: 81 MG/DL (ref 70–108)
HCT VFR BLD CALC: 40.6 % (ref 37–47)
HEMOGLOBIN: 12.9 GM/DL (ref 12–16)
IMMATURE GRANS (ABS): 0.02 THOU/MM3 (ref 0–0.07)
IMMATURE GRANULOCYTES: 0.3 %
LYMPHOCYTES # BLD: 22.2 %
LYMPHOCYTES ABSOLUTE: 1.5 THOU/MM3 (ref 1–4.8)
MCH RBC QN AUTO: 31.1 PG (ref 26–33)
MCHC RBC AUTO-ENTMCNC: 31.8 GM/DL (ref 32.2–35.5)
MCV RBC AUTO: 97.8 FL (ref 81–99)
MONOCYTES # BLD: 9.6 %
MONOCYTES ABSOLUTE: 0.7 THOU/MM3 (ref 0.4–1.3)
NUCLEATED RED BLOOD CELLS: 0 /100 WBC
PLATELET # BLD: 176 THOU/MM3 (ref 130–400)
PMV BLD AUTO: 9.9 FL (ref 9.4–12.4)
POTASSIUM REFLEX MAGNESIUM: 4.4 MEQ/L (ref 3.5–5.2)
RBC # BLD: 4.15 MILL/MM3 (ref 4.2–5.4)
SARS-COV-2, NAAT: NOT DETECTED
SEG NEUTROPHILS: 59.9 %
SEGMENTED NEUTROPHILS ABSOLUTE COUNT: 4.1 THOU/MM3 (ref 1.8–7.7)
SODIUM BLD-SCNC: 144 MEQ/L (ref 135–145)
WBC # BLD: 6.8 THOU/MM3 (ref 4.8–10.8)

## 2020-07-19 PROCEDURE — 3700000000 HC ANESTHESIA ATTENDED CARE: Performed by: UROLOGY

## 2020-07-19 PROCEDURE — C1769 GUIDE WIRE: HCPCS | Performed by: UROLOGY

## 2020-07-19 PROCEDURE — 94760 N-INVAS EAR/PLS OXIMETRY 1: CPT

## 2020-07-19 PROCEDURE — 85025 COMPLETE CBC W/AUTO DIFF WBC: CPT

## 2020-07-19 PROCEDURE — 2580000003 HC RX 258: Performed by: EMERGENCY MEDICINE

## 2020-07-19 PROCEDURE — 2709999900 HC NON-CHARGEABLE SUPPLY: Performed by: UROLOGY

## 2020-07-19 PROCEDURE — 6360000002 HC RX W HCPCS: Performed by: NURSE ANESTHETIST, CERTIFIED REGISTERED

## 2020-07-19 PROCEDURE — 6360000002 HC RX W HCPCS: Performed by: UROLOGY

## 2020-07-19 PROCEDURE — 2720000010 HC SURG SUPPLY STERILE: Performed by: UROLOGY

## 2020-07-19 PROCEDURE — 0T768DZ DILATION OF RIGHT URETER WITH INTRALUMINAL DEVICE, VIA NATURAL OR ARTIFICIAL OPENING ENDOSCOPIC: ICD-10-PCS | Performed by: UROLOGY

## 2020-07-19 PROCEDURE — 3600000003 HC SURGERY LEVEL 3 BASE: Performed by: UROLOGY

## 2020-07-19 PROCEDURE — 2500000003 HC RX 250 WO HCPCS: Performed by: PHARMACIST

## 2020-07-19 PROCEDURE — 6360000002 HC RX W HCPCS: Performed by: FAMILY MEDICINE

## 2020-07-19 PROCEDURE — 7100000000 HC PACU RECOVERY - FIRST 15 MIN: Performed by: UROLOGY

## 2020-07-19 PROCEDURE — 99222 1ST HOSP IP/OBS MODERATE 55: CPT | Performed by: FAMILY MEDICINE

## 2020-07-19 PROCEDURE — 1200000003 HC TELEMETRY R&B

## 2020-07-19 PROCEDURE — 2580000003 HC RX 258: Performed by: FAMILY MEDICINE

## 2020-07-19 PROCEDURE — 6370000000 HC RX 637 (ALT 250 FOR IP): Performed by: FAMILY MEDICINE

## 2020-07-19 PROCEDURE — 99254 IP/OBS CNSLTJ NEW/EST MOD 60: CPT | Performed by: UROLOGY

## 2020-07-19 PROCEDURE — 52332 CYSTOSCOPY AND TREATMENT: CPT | Performed by: UROLOGY

## 2020-07-19 PROCEDURE — 80048 BASIC METABOLIC PNL TOTAL CA: CPT

## 2020-07-19 PROCEDURE — 82365 CALCULUS SPECTROSCOPY: CPT

## 2020-07-19 PROCEDURE — 2500000003 HC RX 250 WO HCPCS: Performed by: NURSE ANESTHETIST, CERTIFIED REGISTERED

## 2020-07-19 PROCEDURE — 36415 COLL VENOUS BLD VENIPUNCTURE: CPT

## 2020-07-19 PROCEDURE — U0002 COVID-19 LAB TEST NON-CDC: HCPCS

## 2020-07-19 PROCEDURE — 3600000013 HC SURGERY LEVEL 3 ADDTL 15MIN: Performed by: UROLOGY

## 2020-07-19 PROCEDURE — C2617 STENT, NON-COR, TEM W/O DEL: HCPCS | Performed by: UROLOGY

## 2020-07-19 PROCEDURE — 3700000001 HC ADD 15 MINUTES (ANESTHESIA): Performed by: UROLOGY

## 2020-07-19 PROCEDURE — 7100000001 HC PACU RECOVERY - ADDTL 15 MIN: Performed by: UROLOGY

## 2020-07-19 PROCEDURE — C1758 CATHETER, URETERAL: HCPCS | Performed by: UROLOGY

## 2020-07-19 PROCEDURE — 52352 CYSTOURETERO W/STONE REMOVE: CPT | Performed by: UROLOGY

## 2020-07-19 PROCEDURE — 99222 1ST HOSP IP/OBS MODERATE 55: CPT | Performed by: NURSE PRACTITIONER

## 2020-07-19 DEVICE — VARIABLE LENGTH URETERAL STENT
Type: IMPLANTABLE DEVICE | Site: URETER | Status: FUNCTIONAL
Brand: CONTOUR VL™

## 2020-07-19 RX ORDER — FENTANYL CITRATE 50 UG/ML
INJECTION, SOLUTION INTRAMUSCULAR; INTRAVENOUS PRN
Status: DISCONTINUED | OUTPATIENT
Start: 2020-07-19 | End: 2020-07-19 | Stop reason: SDUPTHER

## 2020-07-19 RX ORDER — ONDANSETRON 2 MG/ML
INJECTION INTRAMUSCULAR; INTRAVENOUS PRN
Status: DISCONTINUED | OUTPATIENT
Start: 2020-07-19 | End: 2020-07-19 | Stop reason: SDUPTHER

## 2020-07-19 RX ORDER — POLYETHYLENE GLYCOL 3350 17 G/17G
17 POWDER, FOR SOLUTION ORAL DAILY PRN
Status: DISCONTINUED | OUTPATIENT
Start: 2020-07-19 | End: 2020-07-20 | Stop reason: HOSPADM

## 2020-07-19 RX ORDER — LIDOCAINE HCL/PF 100 MG/5ML
SYRINGE (ML) INJECTION PRN
Status: DISCONTINUED | OUTPATIENT
Start: 2020-07-19 | End: 2020-07-19 | Stop reason: SDUPTHER

## 2020-07-19 RX ORDER — CEFAZOLIN SODIUM 1 G/50ML
1 INJECTION, SOLUTION INTRAVENOUS EVERY 8 HOURS
Status: DISCONTINUED | OUTPATIENT
Start: 2020-07-19 | End: 2020-07-20 | Stop reason: HOSPADM

## 2020-07-19 RX ORDER — SODIUM CHLORIDE 9 MG/ML
INJECTION, SOLUTION INTRAVENOUS CONTINUOUS
Status: DISCONTINUED | OUTPATIENT
Start: 2020-07-19 | End: 2020-07-20 | Stop reason: HOSPADM

## 2020-07-19 RX ORDER — SODIUM CHLORIDE 0.9 % (FLUSH) 0.9 %
10 SYRINGE (ML) INJECTION EVERY 12 HOURS SCHEDULED
Status: DISCONTINUED | OUTPATIENT
Start: 2020-07-19 | End: 2020-07-20 | Stop reason: HOSPADM

## 2020-07-19 RX ORDER — KETOROLAC TROMETHAMINE 30 MG/ML
INJECTION, SOLUTION INTRAMUSCULAR; INTRAVENOUS PRN
Status: DISCONTINUED | OUTPATIENT
Start: 2020-07-19 | End: 2020-07-19 | Stop reason: SDUPTHER

## 2020-07-19 RX ORDER — ACETAMINOPHEN 325 MG/1
650 TABLET ORAL EVERY 4 HOURS PRN
Status: DISCONTINUED | OUTPATIENT
Start: 2020-07-19 | End: 2020-07-20 | Stop reason: HOSPADM

## 2020-07-19 RX ORDER — DEXAMETHASONE SODIUM PHOSPHATE 4 MG/ML
INJECTION, SOLUTION INTRA-ARTICULAR; INTRALESIONAL; INTRAMUSCULAR; INTRAVENOUS; SOFT TISSUE PRN
Status: DISCONTINUED | OUTPATIENT
Start: 2020-07-19 | End: 2020-07-19 | Stop reason: SDUPTHER

## 2020-07-19 RX ORDER — TAMSULOSIN HYDROCHLORIDE 0.4 MG/1
0.4 CAPSULE ORAL DAILY
Status: DISCONTINUED | OUTPATIENT
Start: 2020-07-19 | End: 2020-07-20 | Stop reason: HOSPADM

## 2020-07-19 RX ORDER — PROMETHAZINE HYDROCHLORIDE 25 MG/1
12.5 TABLET ORAL EVERY 6 HOURS PRN
Status: DISCONTINUED | OUTPATIENT
Start: 2020-07-19 | End: 2020-07-20 | Stop reason: HOSPADM

## 2020-07-19 RX ORDER — CIPROFLOXACIN 2 MG/ML
400 INJECTION, SOLUTION INTRAVENOUS ONCE
Status: DISCONTINUED | OUTPATIENT
Start: 2020-07-19 | End: 2020-07-19

## 2020-07-19 RX ORDER — MIDAZOLAM HYDROCHLORIDE 1 MG/ML
INJECTION INTRAMUSCULAR; INTRAVENOUS PRN
Status: DISCONTINUED | OUTPATIENT
Start: 2020-07-19 | End: 2020-07-19 | Stop reason: SDUPTHER

## 2020-07-19 RX ORDER — MORPHINE SULFATE 2 MG/ML
2 INJECTION, SOLUTION INTRAMUSCULAR; INTRAVENOUS
Status: DISCONTINUED | OUTPATIENT
Start: 2020-07-19 | End: 2020-07-20 | Stop reason: HOSPADM

## 2020-07-19 RX ORDER — CIPROFLOXACIN 2 MG/ML
400 INJECTION, SOLUTION INTRAVENOUS EVERY 12 HOURS
Status: DISCONTINUED | OUTPATIENT
Start: 2020-07-19 | End: 2020-07-19

## 2020-07-19 RX ORDER — KETOROLAC TROMETHAMINE 30 MG/ML
30 INJECTION, SOLUTION INTRAMUSCULAR; INTRAVENOUS EVERY 6 HOURS
Status: DISCONTINUED | OUTPATIENT
Start: 2020-07-19 | End: 2020-07-19

## 2020-07-19 RX ORDER — CEFAZOLIN SODIUM 1 G/3ML
INJECTION, POWDER, FOR SOLUTION INTRAMUSCULAR; INTRAVENOUS PRN
Status: DISCONTINUED | OUTPATIENT
Start: 2020-07-19 | End: 2020-07-19 | Stop reason: SDUPTHER

## 2020-07-19 RX ORDER — PROPOFOL 10 MG/ML
INJECTION, EMULSION INTRAVENOUS PRN
Status: DISCONTINUED | OUTPATIENT
Start: 2020-07-19 | End: 2020-07-19 | Stop reason: SDUPTHER

## 2020-07-19 RX ORDER — SODIUM CHLORIDE 0.9 % (FLUSH) 0.9 %
10 SYRINGE (ML) INJECTION PRN
Status: DISCONTINUED | OUTPATIENT
Start: 2020-07-19 | End: 2020-07-20 | Stop reason: HOSPADM

## 2020-07-19 RX ORDER — ONDANSETRON 2 MG/ML
4 INJECTION INTRAMUSCULAR; INTRAVENOUS EVERY 6 HOURS PRN
Status: DISCONTINUED | OUTPATIENT
Start: 2020-07-19 | End: 2020-07-20 | Stop reason: HOSPADM

## 2020-07-19 RX ORDER — KETOROLAC TROMETHAMINE 30 MG/ML
15 INJECTION, SOLUTION INTRAMUSCULAR; INTRAVENOUS EVERY 6 HOURS
Status: DISCONTINUED | OUTPATIENT
Start: 2020-07-19 | End: 2020-07-20 | Stop reason: HOSPADM

## 2020-07-19 RX ORDER — CEFAZOLIN SODIUM 1 G/3ML
1 INJECTION, POWDER, FOR SOLUTION INTRAMUSCULAR; INTRAVENOUS EVERY 8 HOURS
Status: DISCONTINUED | OUTPATIENT
Start: 2020-07-19 | End: 2020-07-19

## 2020-07-19 RX ADMIN — CEFAZOLIN 2000 MG: 1 INJECTION, POWDER, FOR SOLUTION INTRAMUSCULAR; INTRAVENOUS; PARENTERAL at 09:29

## 2020-07-19 RX ADMIN — SODIUM CHLORIDE: 9 INJECTION, SOLUTION INTRAVENOUS at 04:05

## 2020-07-19 RX ADMIN — PHENYLEPHRINE HYDROCHLORIDE 150 MCG: 10 INJECTION INTRAVENOUS at 09:51

## 2020-07-19 RX ADMIN — ONDANSETRON HYDROCHLORIDE 4 MG: 4 INJECTION, SOLUTION INTRAMUSCULAR; INTRAVENOUS at 09:29

## 2020-07-19 RX ADMIN — PROPOFOL 180 MG: 10 INJECTION, EMULSION INTRAVENOUS at 09:24

## 2020-07-19 RX ADMIN — FENTANYL CITRATE 100 MCG: 50 INJECTION, SOLUTION INTRAMUSCULAR; INTRAVENOUS at 09:24

## 2020-07-19 RX ADMIN — CEFAZOLIN SODIUM 1 G: 1 INJECTION, SOLUTION INTRAVENOUS at 17:11

## 2020-07-19 RX ADMIN — SODIUM CHLORIDE 1000 ML: 9 INJECTION, SOLUTION INTRAVENOUS at 01:20

## 2020-07-19 RX ADMIN — KETOROLAC TROMETHAMINE 15 MG: 30 INJECTION, SOLUTION INTRAMUSCULAR at 05:06

## 2020-07-19 RX ADMIN — SODIUM CHLORIDE: 9 INJECTION, SOLUTION INTRAVENOUS at 21:26

## 2020-07-19 RX ADMIN — SODIUM CHLORIDE: 9 INJECTION, SOLUTION INTRAVENOUS at 02:37

## 2020-07-19 RX ADMIN — DEXAMETHASONE SODIUM PHOSPHATE 10 MG: 4 INJECTION, SOLUTION INTRAMUSCULAR; INTRAVENOUS at 09:29

## 2020-07-19 RX ADMIN — KETOROLAC TROMETHAMINE 30 MG: 30 INJECTION, SOLUTION INTRAMUSCULAR at 09:59

## 2020-07-19 RX ADMIN — MORPHINE SULFATE 2 MG: 2 INJECTION, SOLUTION INTRAMUSCULAR; INTRAVENOUS at 08:10

## 2020-07-19 RX ADMIN — MIDAZOLAM HYDROCHLORIDE 2 MG: 1 INJECTION, SOLUTION INTRAMUSCULAR; INTRAVENOUS at 09:19

## 2020-07-19 RX ADMIN — FAMOTIDINE 20 MG: 10 INJECTION, SOLUTION INTRAVENOUS at 11:48

## 2020-07-19 RX ADMIN — SODIUM CHLORIDE: 9 INJECTION, SOLUTION INTRAVENOUS at 09:19

## 2020-07-19 RX ADMIN — TAMSULOSIN HYDROCHLORIDE 0.4 MG: 0.4 CAPSULE ORAL at 13:07

## 2020-07-19 RX ADMIN — Medication 100 MG: at 09:24

## 2020-07-19 RX ADMIN — PHENYLEPHRINE HYDROCHLORIDE 150 MCG: 10 INJECTION INTRAVENOUS at 09:40

## 2020-07-19 ASSESSMENT — PAIN DESCRIPTION - ORIENTATION
ORIENTATION: RIGHT

## 2020-07-19 ASSESSMENT — PAIN DESCRIPTION - PROGRESSION

## 2020-07-19 ASSESSMENT — PULMONARY FUNCTION TESTS
PIF_VALUE: 2
PIF_VALUE: 14
PIF_VALUE: 3
PIF_VALUE: 9
PIF_VALUE: 2
PIF_VALUE: 0
PIF_VALUE: 16
PIF_VALUE: 6
PIF_VALUE: 0
PIF_VALUE: 8
PIF_VALUE: 2
PIF_VALUE: 11
PIF_VALUE: 2
PIF_VALUE: 1
PIF_VALUE: 2
PIF_VALUE: 2
PIF_VALUE: 11
PIF_VALUE: 2
PIF_VALUE: 3
PIF_VALUE: 0
PIF_VALUE: 2
PIF_VALUE: 2
PIF_VALUE: 8
PIF_VALUE: 2
PIF_VALUE: 2
PIF_VALUE: 15
PIF_VALUE: 9
PIF_VALUE: 1
PIF_VALUE: 0
PIF_VALUE: 9
PIF_VALUE: 11
PIF_VALUE: 3
PIF_VALUE: 2
PIF_VALUE: 0
PIF_VALUE: 3
PIF_VALUE: 1
PIF_VALUE: 2
PIF_VALUE: 8
PIF_VALUE: 9
PIF_VALUE: 0
PIF_VALUE: 2

## 2020-07-19 ASSESSMENT — PAIN DESCRIPTION - ONSET
ONSET: ON-GOING

## 2020-07-19 ASSESSMENT — PAIN - FUNCTIONAL ASSESSMENT
PAIN_FUNCTIONAL_ASSESSMENT: ACTIVITIES ARE NOT PREVENTED

## 2020-07-19 ASSESSMENT — PAIN DESCRIPTION - DESCRIPTORS
DESCRIPTORS: DISCOMFORT

## 2020-07-19 ASSESSMENT — PAIN DESCRIPTION - FREQUENCY
FREQUENCY: CONTINUOUS

## 2020-07-19 ASSESSMENT — PAIN DESCRIPTION - LOCATION
LOCATION: FLANK

## 2020-07-19 ASSESSMENT — PAIN DESCRIPTION - PAIN TYPE
TYPE: ACUTE PAIN

## 2020-07-19 ASSESSMENT — PAIN SCALES - GENERAL
PAINLEVEL_OUTOF10: 5
PAINLEVEL_OUTOF10: 0
PAINLEVEL_OUTOF10: 5
PAINLEVEL_OUTOF10: 0
PAINLEVEL_OUTOF10: 10
PAINLEVEL_OUTOF10: 6
PAINLEVEL_OUTOF10: 4
PAINLEVEL_OUTOF10: 5

## 2020-07-19 ASSESSMENT — PAIN SCALES - WONG BAKER
WONGBAKER_NUMERICALRESPONSE: 0
WONGBAKER_NUMERICALRESPONSE: 0

## 2020-07-19 NOTE — CONSULTS
Urology Consult Note      History Obtained From:  patient    ED Chief complaint:   Chief Complaint   Patient presents with    Flank Pain       Reason for consultation: Distal right ureteral stones    HISTORY OF PRESENT ILLNESS:      The patient is a 63 y.o. female with significant past medical history of recurrent ER visits for Right flank pain who presented with recurrent right flank pain with nausea; denies vomiting, gross hematuria or fever. She was seen in the ER on 7/15/2020 with similar complaints. CT at that time showed a distal R ureteral stone, 5 mm in size, also bilateral kidney stones. She was medicated for pain and discharge. Did well until last night when the pain recurred. Denies passing any stone spontaneously. A repeat CT showed again the distal UVj Right stone and the right renal stone migrated distally, increased perinephric fluid/hydronephrosis. Small left calyceal non-obstructive renal stone. Patient denies prior history of stone disease. Otherwise healthy olny on multi-vitamins.     Past Medical History:        Diagnosis Date    Kidney stones      Past Surgical History:        Procedure Laterality Date    WISDOM TOOTH EXTRACTION         Social History     Socioeconomic History    Marital status:      Spouse name: Not on file    Number of children: Not on file    Years of education: Not on file    Highest education level: Not on file   Occupational History    Not on file   Social Needs    Financial resource strain: Not on file    Food insecurity     Worry: Not on file     Inability: Not on file    Transportation needs     Medical: Not on file     Non-medical: Not on file   Tobacco Use    Smoking status: Never Smoker    Smokeless tobacco: Never Used   Substance and Sexual Activity    Alcohol use: Yes     Comment: rare    Drug use: No    Sexual activity: Not on file   Lifestyle    Physical activity     Days per week: Not on file     Minutes per session: Not on file    Stress: Not on file   Relationships    Social connections     Talks on phone: Not on file     Gets together: Not on file     Attends Religion service: Not on file     Active member of club or organization: Not on file     Attends meetings of clubs or organizations: Not on file     Relationship status: Not on file    Intimate partner violence     Fear of current or ex partner: Not on file     Emotionally abused: Not on file     Physically abused: Not on file     Forced sexual activity: Not on file   Other Topics Concern    Not on file   Social History Narrative    Not on file       AL  Family History   Problem Relation Age of Onset    No Known Problems Mother        Allergies:  No Known Allergies    ROS:  Constitutional: Negative for chills, fatigue, fever, or weight loss. Eyes: Denies reported visual changes. ENT: Denies headache, difficulty swallowing, nose bleeds, ringing in ears, or earaches. Cardiovascular: Negative for chest pain, palpitations, tachycardia or edema. Respiratory: Denies cough or SOB. GI:The patient denies abdominal or flank pain, anorexia, nausea or vomiting. : See HPI  Musculoskeletal: Patient denies low back pain or painful or reduced ROM of the back or extremities. Neurological: The patient denies any symptoms of neurological impairment or               TIA's; no history of stroke. Lymphatic: Denies swollen glands in neck, axillary or inguinal areas. Psychiatric: Denies anxiety or depression. Skin: Denies rash or lesions. The remainder of the complete ROS is negative    PHYSICAL EXAM:  VITALS:  /68   Pulse 90   Temp 98.4 °F (36.9 °C) (Oral)   Resp 18   Ht 5' 6\" (1.676 m)   Wt 118 lb 11.2 oz (53.8 kg)   LMP 11/16/2017   SpO2 96%   BMI 19.16 kg/m² . Nursing note and vitals reviewed. Constitutional:   Alert and oriented, no acute distress and cooperative to examination with appropriate mood and affect. HEENT:   Head::Normocephalic and atraumatic.    Eyes: No scleral icterus. Nose:The external appearance of the nose is normal  Ears: The ears appear normal to external inspection   Neck: Supple, symmetrical    Pulmonary/Chest:  Chest symmetric with normal A/P diameter,  Normal respiratory rate and rhthym. No use of accessory muscles. Abdominal:   Soft. + Right CVA tenderness. Extremities:   No edema or cyanosis. DATA:  Labs:  CBC:   Recent Labs     07/18/20  2250   WBC 10.3   HGB 12.9        BMP:    Recent Labs     07/18/20  2250      K 4.3   CL 99   CO2 24   BUN 23*   CREATININE 1.3*   GLUCOSE 110*   CALCIUM 9.2     Hepatic:   Recent Labs     07/18/20  2250   AST 25   ALT 18   BILITOT 0.3   ALKPHOS 61                   INR: No results for input(s): INR in the last 72 hours. U/A:    Lab Results   Component Value Date    COLORU YELLOW 07/18/2020    PHUR 7.5 07/18/2020    LABCAST NONE SEEN 07/15/2020    LABCAST NONE SEEN 07/15/2020    WBCUA NONE SEEN 07/15/2020    RBCUA NONE SEEN 07/15/2020    YEAST NONE SEEN 07/15/2020    BACTERIA NONE SEEN 07/15/2020    SPECGRAV 1.011 07/15/2020    LEUKOCYTESUR NEGATIVE 07/18/2020    UROBILINOGEN 0.2 07/18/2020    BILIRUBINUR NEGATIVE 07/18/2020    BLOODU NEGATIVE 07/18/2020    GLUCOSEU NEGATIVE 07/18/2020    AMORPHOUS DEBRIS 05/16/2018       Imaging: The patient has had a CT WITHOUT CONTRAST which I have reviewed along with its accompanying report. The study demonstrates      1. Visualized 5 mm calculus now seen at the base of the urinary bladder at the ureterovesicular junction. The previously noted 4.7 mm stone may also be present immediately adjacent to the first mentioned. 2. Interval increasing right perinephric fluid. 3. Moderate severe right hydronephrotic changes.     4. Visualized left renal calculi stable compared to prior study.               IMPRESSION:   Right distal ureteral stones  Right hydronephrosis w perinephric fluid collection  Right flank pain  Left renal stone    Plan: Recommend intervention. R&B discussed with the patient, including but not limited to anesthetic risks, bleeding, infection, inability to place the stent requiring percutaneous nephrostomy tube placement (tube in the back), necessity for further surgeries, discomfort, pain and voiding symptoms associated with the stent among others. All questions answered, consent form signed.   Plan for: Cystoscopy, right ureteral stent placement under fluoroscopic guidance, possible ureteroscopy with ho;lmium laser        Thank you for including us in the care of Rosa Garrido      Electronically signed by Ghassan Richards MD on 7/19/2020 at 7:04 AM    Urology

## 2020-07-19 NOTE — ED PROVIDER NOTES
Allergic/Immunologic: Negative for environmental allergies and food allergies. Neurological: Negative for dizziness, tremors, syncope, weakness and headaches. Psychiatric/Behavioral: Negative for agitation, behavioral problems, confusion, self-injury, sleep disturbance and suicidal ideas. PAST MEDICAL HISTORY    has a past medical history of Kidney stones. SURGICAL HISTORY      has a past surgical history that includes [place] tooth extraction. CURRENT MEDICATIONS       Current Discharge Medication List      CONTINUE these medications which have NOT CHANGED    Details   ketorolac (TORADOL) 10 MG tablet Take 1 tablet by mouth every 6 hours as needed for Pain  Qty: 20 tablet, Refills: 0      tamsulosin (FLOMAX) 0.4 MG capsule Take 1 capsule by mouth daily for 5 doses  Qty: 5 capsule, Refills: 0      Multiple Vitamins-Minerals (MULTIVITAMIN ADULT PO) Take by mouth             ALLERGIES     has No Known Allergies. FAMILY HISTORY     She indicated that her mother is alive. She indicated that her father is . family history includes No Known Problems in her mother. SOCIAL HISTORY      reports that she has never smoked. She has never used smokeless tobacco. She reports current alcohol use. She reports that she does not use drugs. PHYSICAL EXAM     INITIAL VITALS:  height is 5' 6\" (1.676 m) and weight is 118 lb 11.2 oz (53.8 kg). Her oral temperature is 98.4 °F (36.9 °C). Her blood pressure is 89/56 (abnormal) and her pulse is 84. Her respiration is 18 and oxygen saturation is 97%. Physical Exam  Vitals signs and nursing note reviewed. Constitutional:       Appearance: She is well-developed. She is not diaphoretic. HENT:      Head: Normocephalic and atraumatic. Nose: Nose normal.   Eyes:      General: No scleral icterus. Right eye: No discharge. Left eye: No discharge.       Conjunctiva/sclera: Conjunctivae normal.      Pupils: Pupils are equal, round, and CONTRAST Additional Contrast? None   Final Result   . 1. Visualized 5 mm calculus now seen at the base of the urinary bladder at the ureterovesicular junction. The previously noted 4.7 mm stone may also be present immediately adjacent to the first mentioned. 2. Interval increasing right perinephric fluid. 3. Moderate severe right hydronephrotic changes. 4. Visualized left renal calculi stable compared to prior study. **This report has been created using voice recognition software. It may contain minor errors which are inherent in voice recognition technology. **      Final report electronically signed by Dr. Cristóbal Coyle on 7/19/2020 12:52 AM          []Visualized and interpreted by me   [] Radiologist's Wet Read Report Reviewed   [] Discussed with Radiologist.    Dhiraj Gaxiola:   Results for orders placed or performed during the hospital encounter of 07/18/20   CBC Auto Differential   Result Value Ref Range    WBC 10.3 4.8 - 10.8 thou/mm3    RBC 4.11 (L) 4.20 - 5.40 mill/mm3    Hemoglobin 12.9 12.0 - 16.0 gm/dl    Hematocrit 39.8 37.0 - 47.0 %    MCV 96.8 81.0 - 99.0 fL    MCH 31.4 26.0 - 33.0 pg    MCHC 32.4 32.2 - 35.5 gm/dl    RDW-CV 13.2 11.5 - 14.5 %    RDW-SD 47.1 (H) 35.0 - 45.0 fL    Platelets 036 237 - 044 thou/mm3    MPV 10.1 9.4 - 12.4 fL    Seg Neutrophils 69.1 %    Lymphocytes 17.9 %    Monocytes 8.2 %    Eosinophils 3.7 %    Basophils 0.6 %    Immature Granulocytes 0.5 %    Segs Absolute 7.1 1.8 - 7.7 thou/mm3    Lymphocytes Absolute 1.8 1.0 - 4.8 thou/mm3    Monocytes Absolute 0.8 0.4 - 1.3 thou/mm3    Eosinophils Absolute 0.4 0.0 - 0.4 thou/mm3    Basophils Absolute 0.1 0.0 - 0.1 thou/mm3    Immature Grans (Abs) 0.05 0.00 - 0.07 thou/mm3    nRBC 0 /100 wbc   Comprehensive Metabolic Panel   Result Value Ref Range    Glucose 110 (H) 70 - 108 mg/dL    CREATININE 1.3 (H) 0.4 - 1.2 mg/dL    BUN 23 (H) 7 - 22 mg/dL    Sodium 136 135 - 145 meq/L    Potassium 4.3 3.5 - 5.2 meq/L    Chloride 99 98 - 111 meq/L    CO2 24 23 - 33 meq/L    Calcium 9.2 8.5 - 10.5 mg/dL    AST 25 5 - 40 U/L    Alkaline Phosphatase 61 38 - 126 U/L    Total Protein 6.8 6.1 - 8.0 g/dL    Alb 4.6 3.5 - 5.1 g/dL    Total Bilirubin 0.3 0.3 - 1.2 mg/dL    ALT 18 11 - 66 U/L   Lipase   Result Value Ref Range    Lipase 33.8 5.6 - 51.3 U/L   Urine reflex to culture    Specimen: Urine, clean catch   Result Value Ref Range    Glucose, Ur NEGATIVE NEGATIVE mg/dl    Bilirubin Urine NEGATIVE NEGATIVE    Ketones, Urine TRACE (A) NEGATIVE    Specific Gravity, Urine < 1.005 1.002 - 1.03    Blood, Urine NEGATIVE NEGATIVE    pH, UA 7.5 5.0 - 9.0    Protein, UA NEGATIVE NEGATIVE    Urobilinogen, Urine 0.2 0.0 - 1.0 eu/dl    Nitrite, Urine NEGATIVE NEGATIVE    Leukocyte Esterase, Urine NEGATIVE NEGATIVE    Color, UA YELLOW STRAW-YELL    Character, Urine CLEAR CLEAR-SL C   Anion Gap   Result Value Ref Range    Anion Gap 13.0 8.0 - 16.0 meq/L   Glomerular Filtration Rate, Estimated   Result Value Ref Range    Est, Glom Filt Rate 41 (A) ml/min/1.73m2   Osmolality   Result Value Ref Range    Osmolality Calc 276.3 275.0 - 300 mOsmol/kg   COVID-19   Result Value Ref Range    SARS-CoV-2, NAAT NOT DETECTED NOT DETECT       EMERGENCY DEPARTMENT COURSE:   Vitals:    Vitals:    07/19/20 0115 07/19/20 0234 07/19/20 0330 07/19/20 0500   BP: 117/77 136/81 121/78 114/68   Pulse: 67  90    Resp: 16 18 18    Temp:   98.4 °F (36.9 °C)    TempSrc:   Oral    SpO2: 98% 99% 96%    Weight:   118 lb 11.2 oz (53.8 kg)    Height:   5' 6\" (1.676 m)        10:43 PM: Patient is seen and evaluated in a timely fashion. ACTIONS:  Large bore IV  NS bolus 1L  Labs  CT A/P with IV contrast  Meds: IV Toradol and Morphine    MEDICAL DECISION MAKINGS:    Patient received normal saline bolus, pain was controlled with IV Toradol, and fentanyl. Pain slightly improved on reassessment. Her urine has no UTI changes. No hematuria. Her creatinine went up to 1.3. WBC 10.3.   CT abdomen pelvis without contrast showed visualized 5 mm calculus now seen at the base of the urinary bladder at the UV junction, the previously noted 4.7 mm stone may also be present immediately adjacent to the first dimension. Interval increasing right perinephric fluid, moderate to severe right hydronephrotic changes. Patient had worsening ureteral obstruction with two stones now at right UVJ. I felt admission for surgery was warranted. Discussed with urology on-call who agreed. Patient was admitted to hospitalist service. CRITICAL CARE:   None    CONSULTS:  Bobo Ortiz:  None    FINAL IMPRESSION      1.  Occlusion of ureter due to calculus          DISPOSITION/PLAN   Admit    PATIENT REFERRED TO:  Carolynsnehal Lance, [address] [facility] [address] [id number] [phone]            DISCHARGE MEDICATIONS:  Current Discharge Medication List          (Please note that portions of this note were completed with a voice recognition program.  Efforts were made to edit the dictations but occasionally words aremis-transcribed.)    MD Jake Umana MD  07/19/20 [id number]

## 2020-07-19 NOTE — PLAN OF CARE
Problem: Pain:  Goal: Pain level will decrease  Description: Pain level will decrease  Outcome: Ongoing  Note: Patient voicing 4/10 pain in right flank. Patient repositioning self. Patient given pain medications down in ED. Goal: Control of acute pain  Description: Control of acute pain  Outcome: Ongoing  Note: Patient voicing 4/10 pain in right flank. Patient repositioning self. Patient given pain medications down in ED. Goal: Control of chronic pain  Description: Control of chronic pain  Outcome: Ongoing  Note: Patient voicing 4/10 pain in right flank. Patient repositioning self. Patient given pain medications down in ED. Problem: Discharge Planning:  Goal: Discharged to appropriate level of care  Description: Discharged to appropriate level of care  Outcome: Ongoing  Note: Plan to discharge home. Problem: Cardiac Output - Decreased:  Goal: Hemodynamic stability will improve  Description: Hemodynamic stability will improve  Outcome: Ongoing  Note: Vitals and labs monitored. Problem: Falls - Risk of:  Goal: Will remain free from falls  Description: Will remain free from falls  Outcome: Ongoing  Note: Free of falls this shift. Call light within reach. Bed alarm on. Falling star program in place. Care plans reviewed with patient using teachback methods. No concerns voiced at this time.

## 2020-07-19 NOTE — H&P
History & Physical        Patient:  Ulysses Akin  YOB: 1955    MRN: [id number]     Acct: [de-identified]    PCP: Bam Aragon MD    Date of Admission: 7/18/2020    Date of Service: Pt seen/examined on 07/19/20  and Admitted to Inpatient with expected LOS greater than two midnights due to medical therapy. Chief Complaint:  Flank pain     Impression:    Ureteral stone  Moderate severe right hydronephrosis  Nonobstructing calculi of the left kidney  Right-sided perinephric fluid    Plan:     Patient with multiple stones with evidence of right-sided hydronephrosis, failing conservative management as outpatient. Case was discussed with the urologist in the ER, plans for intervention in the morning. COVID negative  Continue IV fluids 150 cc/h normal saline  Flomax 0.4 mg p.o. daily  Toradol 30 mg every 6 hours PRN for pain  Morphine as backup if needed for breakthrough pain  Strain urine if patient passes stone, send for stone analysis  Consulted urology  N.p.o.  Zofran for nausea  Vitals are currently stable, creatinine has jumped from 0.8-1.3 in 3 days, WBC count normal but went from 7.5-10.3 in 3 days. Her urinalysis is otherwise unremarkable. We will hold off on starting any antibiotics at this time. History Of Present Illness:      63 y.o. female with past medical history of kidney stones who presented to [address] [place] [address] with complaints of right-sided flank pain. Patient was recently seen in the emergency room on the 15th and diagnosed with a ureteral stone 5 mm in size, discharged with conservative management. She was doing well at home without any symptoms until tonight when she had severe pain similar to the one on Wednesday. She rated her pain at 10 out of 10, colicky in nature, no radiation, no associated nausea vomiting although she did have some urgency but no dysuria frequency or fever or chills at home.   She took the Toradol that was previously prescribed but this did not alleviate her symptoms hence she came into the hospital. She was given morphine and ketorolac in the emergency room thereafter her pain was 5 out of 10. A repeat scan of the abdomen was done without contrast that demonstrated 5 mm calculus at the base of the urinary bladder at the ureterovesicular junction, 4.7 mm stone possibly present adjacent to this 1, interval increasing of the right perinephric fluid, moderate severe right hydronephrotic changes. Case was discussed with the on-call urologist who recommended admission with possible intervention in the morning. Patient will be admitted to the floor with consultations to urology for further management of her ureteral stone. Past Medical History:          Diagnosis Date    Kidney stones        Past Surgical History:          Procedure Laterality Date    WISDOM TOOTH EXTRACTION         Medications Prior to Admission:      Prior to Admission medications    Medication Sig Start Date End Date Taking? Authorizing Provider   ketorolac (TORADOL) 10 MG tablet Take 1 tablet by mouth every 6 hours as needed for Pain 7/15/20   Jennifer Grand Counts, APRN - CNP   tamsulosin (FLOMAX) 0.4 MG capsule Take 1 capsule by mouth daily for 5 doses 7/15/20 7/20/20  Reji Counts, APRN - CNP   Multiple Vitamins-Minerals (MULTIVITAMIN ADULT PO) Take by mouth    Historical Provider, MD       Allergies:  Patient has no known allergies. Social History:      The patient currently lives home    TOBACCO:   reports that she has never smoked. She has never used smokeless tobacco.  ETOH:   reports current alcohol use. Family History:       Reviewed in detail and negative for DM, CAD, Cancer, CVA. Positive as follows:        Problem Relation Age of Onset    No Known Problems Mother        Diet:  Diet NPO Effective Now    REVIEW OF SYSTEMS:   Pertinent positives as noted in the HPI. All other systems reviewed and negative.     PHYSICAL EXAM:    /77   Pulse 67 Temp 98.2 °F (36.8 °C)   Resp 16   Ht 5' 6\" (1.676 m)   Wt 115 lb (52.2 kg)   LMP 11/16/2017   SpO2 98%   BMI 18.56 kg/m²     General appearance:  +ve distress, appears stated age and cooperative. HEENT:  Normal cephalic, atraumatic without obvious deformity. Pupils equal, round, and reactive to light. Extra ocular muscles intact. Conjunctivae/corneas clear. Neck: Supple, with full range of motion. No jugular venous distention. Trachea midline. Respiratory:  Normal respiratory effort. Clear to auscultation, bilaterally without Rales/Wheezes/Rhonchi. Cardiovascular:  Regular rate and rhythm with normal S1/S2 without murmurs, rubs or gallops. Abdomen: Soft, ND, Right sided flank tenderness that patient feels but not reproducible with palpation, no abd tenderness with palpation now but previously Right quad tender  Musculoskeletal:  No clubbing, cyanosis or edema bilaterally. Full range of motion without deformity. Skin: Skin color, texture, turgor normal.  No rashes or lesions. Neurologic:  grossly non-focal.  Psychiatric:  Alert and oriented, thought content appropriate, normal insight  Peripheral Pulses: +2 palpable, equal bilaterally       Labs:     Recent Labs     07/18/20  2250   WBC 10.3   HGB 12.9   HCT 39.8        Recent Labs     07/18/20  2250      K 4.3   CL 99   CO2 24   BUN 23*   CREATININE 1.3*   CALCIUM 9.2     Recent Labs     07/18/20  2250   AST 25   ALT 18   BILITOT 0.3   ALKPHOS 61     No results for input(s): INR in the last 72 hours. No results for input(s): Reinier Daomn in the last 72 hours. Urinalysis:      Lab Results   Component Value Date    NITRU NEGATIVE 07/18/2020    WBCUA NONE SEEN 07/15/2020    BACTERIA NONE SEEN 07/15/2020    RBCUA NONE SEEN 07/15/2020    BLOODU NEGATIVE 07/18/2020    SPECGRAV 1.011 07/15/2020    GLUCOSEU NEGATIVE 07/18/2020       Intake & Output:  No intake/output data recorded. No intake/output data recorded.       Radiology: CT ABDOMEN PELVIS WO CONTRAST Additional Contrast? None   Final Result   . 1. Visualized 5 mm calculus now seen at the base of the urinary bladder at the ureterovesicular junction. The previously noted 4.7 mm stone may also be present immediately adjacent to the first mentioned. 2. Interval increasing right perinephric fluid. 3. Moderate severe right hydronephrotic changes. 4. Visualized left renal calculi stable compared to prior study. **This report has been created using voice recognition software. It may contain minor errors which are inherent in voice recognition technology. **      Final report electronically signed by Dr. Dione Pryor on 7/19/2020 12:52 AM           DVT prophylaxis: scd , lovenox    Code Status: Full    Thank you Solis Yañez MD for the opportunity to be involved in this patient's care.     Electronically signed by Jaime Bagley MD on 7/19/2020 at 2:36 AM

## 2020-07-19 NOTE — ED NOTES
Patient resting in bed after returning from CT. Brother at bedside. Patient respirations unlabored and regular. Rates pain at a 5/10. Call light within reach. Side rails up x2. Will reassess.       Sandra Doyle RN  07/19/20 [id number]

## 2020-07-19 NOTE — ED NOTES
Patient medicated per MAR at this time. Patient states pain level of 10/10. IV inserted, clean, dry, intact site. Brother at bedside. Call light in reach. Side rails x2. Will reassess.      Mechelle Arredondo RN  07/19/20 [id number]

## 2020-07-19 NOTE — PROGRESS NOTES
flush  10 mL Intravenous 2 times per day    enoxaparin  40 mg Subcutaneous Daily    famotidine (PEPCID) injection  20 mg Intravenous Daily    ketorolac  15 mg Intravenous Q6H    ciprofloxacin  400 mg Intravenous Q12H     PRN Meds: sodium chloride flush, acetaminophen, polyethylene glycol, promethazine **OR** ondansetron, morphine      Intake/Output Summary (Last 24 hours) at 7/19/2020 0777  Last data filed at 7/19/2020 0511  Gross per 24 hour   Intake 160.81 ml   Output 300 ml   Net -139.19 ml       Diet:  Diet NPO Effective Now    Exam:  /68   Pulse 90   Temp 98.4 °F (36.9 °C) (Oral)   Resp 18   Ht 5' 6\" (1.676 m)   Wt 118 lb 11.2 oz (53.8 kg)   LMP 11/16/2017   SpO2 96%   BMI 19.16 kg/m²     General appearance: No apparent distress, appears stated age and cooperative. HEENT: Pupils equal, round, and reactive to light. Conjunctivae/corneas clear. Neck: Supple, with full range of motion. No jugular venous distention. Trachea midline. Respiratory:  Normal respiratory effort. Clear to auscultation, bilaterally without Rales/Wheezes/Rhonchi. Cardiovascular: Regular rate and rhythm with normal S1/S2 without murmurs, rubs or gallops. Abdomen: Soft, non-tender, non-distended with normal bowel sounds. Musculoskeletal: passive and active ROM x 4 extremities. Skin: Skin color, texture, turgor normal.    Neurologic:  Neurovascularly intact without any focal sensory/motor deficits.  Cranial nerves: II-XII intact, grossly non-focal.  Psychiatric: Alert and oriented, thought content appropriate  Capillary Refill: Brisk,< 3 seconds   Peripheral Pulses: +2 palpable, equal bilaterally       Labs:   Recent Labs     07/18/20  2250   WBC 10.3   HGB 12.9   HCT 39.8        Recent Labs     07/18/20  2250      K 4.3   CL 99   CO2 24   BUN 23*   CREATININE 1.3*   CALCIUM 9.2     Recent Labs     07/18/20  2250   AST 25   ALT 18   BILITOT 0.3   ALKPHOS 61     Microbiology:    COVID-19 negative  Urine culture from July 17, 2020 showed Streptococcus mitis/Streptococcus oralis however only a colony count of 10,000-20,000    Urinalysis:      Lab Results   Component Value Date    NITRU NEGATIVE 07/18/2020    WBCUA NONE SEEN 07/15/2020    BACTERIA NONE SEEN 07/15/2020    RBCUA NONE SEEN 07/15/2020    BLOODU NEGATIVE 07/18/2020    SPECGRAV 1.011 07/15/2020    GLUCOSEU NEGATIVE 07/18/2020       Radiology:  CT ABDOMEN PELVIS WO CONTRAST Additional Contrast? None   Final Result   . 1. Visualized 5 mm calculus now seen at the base of the urinary bladder at the ureterovesicular junction. The previously noted 4.7 mm stone may also be present immediately adjacent to the first mentioned. 2. Interval increasing right perinephric fluid. 3. Moderate severe right hydronephrotic changes. 4. Visualized left renal calculi stable compared to prior study. **This report has been created using voice recognition software. It may contain minor errors which are inherent in voice recognition technology. **      Final report electronically signed by Dr. Chava Hodge on 7/19/2020 12:52 AM          DVT prophylaxis: [x] Lovenox                                 [] SCDs                                 [] SQ Heparin                                 [] Encourage ambulation           [] Already on Anticoagulation     Code Status: Full Code    Tele:   [] yes             [x] no    Active Hospital Problems    Diagnosis Date Noted    Ureteral stone [N20.1] 07/19/2020       Electronically signed by ZAHIRA Issa CNP on 7/19/2020 at 7:25 AM

## 2020-07-19 NOTE — PROGRESS NOTES
Pt admitted to  8AB30 via from ED from private residence. Complaints: pain in right flank. IV IV push only, no IV fluids infusing into the antecubital left, condition patent and no redness   IV site free of s/s of infection or infiltration. Vital signs obtained. Assessment and data collection initiated. Two nurse skin assessment performed by Allen HINES and KAY Boateng RN. Oriented to room. Policies and procedures for 8AB explained. All questions answered with no further questions at this time. Fall prevention and safety brochure discussed with patient. Bed alarm on. Call light in reach. Oriented to room. Chitra Leigh RN 7/19/2020 6:17 AM     Explained patients right to have family, representative or physician notified of their admission. Patient has Declined for physician to be notified. Patient has Declined for family/representative to be notified. Patient would like family notified once per shift?  No

## 2020-07-19 NOTE — ED NOTES
Patient resting in bed at this time. Patient rates pain at a 5/10. Brother remains at bedside. Patient respirations unlabored and regular. AOx4. Call light in reach. Side rails up x2. Will reassess.       Vanessa Grsos RN  07/19/20 9635

## 2020-07-19 NOTE — ANESTHESIA POSTPROCEDURE EVALUATION
Department of Anesthesiology  Postprocedure Note    Patient: Iesha Richard  MRN: [id number]  YOB: 1955  Date of evaluation: 7/19/2020  Time:  10:14 AM     Procedure Summary     Date:  07/19/20 Room / Location:  [facility]    Anesthesia Start:  2360 Anesthesia Stop:  1004    Procedure:  CYSTOSCOPY, RIGHT URETEROSCOPY, BASKET RETRIEVAL OF STONE, AND RIGHT URETERAL STENT INSERTION (Right Bladder) Diagnosis:  (Left kidney stone)    Surgeon: Yuliana See MD Responsible Provider:  Jeramie Cooper DO    Anesthesia Type:  general ASA Status:  2          Anesthesia Type: No value filed. Uyen Phase I: Uyen Score: 9    Uyen Phase II:      Last vitals: Reviewed and per EMR flowsheets.        Anesthesia Post Evaluation    Patient location during evaluation: PACU  Patient participation: complete - patient participated  Level of consciousness: awake  Airway patency: patent  Nausea & Vomiting: no vomiting and no nausea  Cardiovascular status: hemodynamically stable  Respiratory status: acceptable  Hydration status: stable

## 2020-07-19 NOTE — ED NOTES
Pt swabbed for COVID at this time. Pt rates pain 5/10. Pt updated on POC. Pt vital signs stable and respirations unlabored.       Joellen Marino RN  07/19/20 6546

## 2020-07-19 NOTE — PROGRESS NOTES
1000: Patient arrived to PACU. Report received from Jean, CRNA and Pelham, RN. Patient placed on cardiac monitor. LMA removed by CRNA. Patient on room air. Waking up. Denies pain and nausea. 1010: Pt resting. 1016: pt placed on bedpan per request.   1017: Attempted to call report. RN will call back when available. 1028: Pt was able to urinate approx 200ml. chux and gown changed. 1032: Pt given ice chips. Denies nausea. 1041: Report called to AVRIL HINES.   1803: patient meets pacu discharge criteria. Patient transported to  in stable condition on room air. Chart on bed. Patient's daughter notified via telephone.

## 2020-07-19 NOTE — ED NOTES
Pt ambulated to the restroom without any difficulties. Pt updated on POC. Denies any needs.       Nellie Gleason RN  07/18/20 2722

## 2020-07-19 NOTE — ED NOTES
ED to inpatient nurses report    Chief Complaint   Patient presents with    Flank Pain      Present to ED from home  LOC: alert and orientated to name, place, date  Vital signs   Vitals:    07/18/20 2353 07/19/20 0033 07/19/20 0115 07/19/20 0234   BP: (!) 140/79 118/81 117/77 136/81   Pulse: 81 65 67    Resp: 18 16 16 18   Temp:       SpO2: 98% 98% 98% 99%   Weight:       Height:          Oxygen Baseline room air    Current needs required room air Bipap/Cpap No  LDAs:   Peripheral IV 07/18/20 Left Antecubital (Active)   Site Assessment Clean; Intact;Dry 07/18/20 2315   Line Status Infusing;Flushed 07/18/20 2315   Dressing Status Clean;Dry; Intact 07/18/20 2315     Mobility: Independent  Pending ED orders: none  Present condition: pt stable. Pt rates pain 5/10 at this time and tolerable.  Pt NPO    Electronically signed by Siri Ta RN on 7/19/2020 at 3:34 AM       Siri Ta RN  07/19/20 2587

## 2020-07-20 ENCOUNTER — TELEPHONE (OUTPATIENT)
Dept: UROLOGY | Age: 65
End: 2020-07-20

## 2020-07-20 VITALS
SYSTOLIC BLOOD PRESSURE: 134 MMHG | OXYGEN SATURATION: 99 % | TEMPERATURE: 97.9 F | WEIGHT: 119.13 LBS | RESPIRATION RATE: 20 BRPM | BODY MASS INDEX: 19.15 KG/M2 | HEART RATE: 66 BPM | DIASTOLIC BLOOD PRESSURE: 64 MMHG | HEIGHT: 66 IN

## 2020-07-20 PROCEDURE — 99232 SBSQ HOSP IP/OBS MODERATE 35: CPT | Performed by: NURSE PRACTITIONER

## 2020-07-20 PROCEDURE — 2580000003 HC RX 258: Performed by: FAMILY MEDICINE

## 2020-07-20 PROCEDURE — 6370000000 HC RX 637 (ALT 250 FOR IP): Performed by: NURSE PRACTITIONER

## 2020-07-20 PROCEDURE — 6360000002 HC RX W HCPCS: Performed by: UROLOGY

## 2020-07-20 PROCEDURE — 99239 HOSP IP/OBS DSCHRG MGMT >30: CPT | Performed by: NURSE PRACTITIONER

## 2020-07-20 PROCEDURE — 6370000000 HC RX 637 (ALT 250 FOR IP): Performed by: FAMILY MEDICINE

## 2020-07-20 PROCEDURE — 51798 US URINE CAPACITY MEASURE: CPT

## 2020-07-20 RX ORDER — CEFDINIR 300 MG/1
300 CAPSULE ORAL 2 TIMES DAILY
Qty: 10 CAPSULE | Refills: 0 | Status: SHIPPED | OUTPATIENT
Start: 2020-07-20 | End: 2020-07-25

## 2020-07-20 RX ORDER — ATROPA BELLADONNA AND OPIUM 16.2; 3 MG/1; MG/1
30 SUPPOSITORY RECTAL EVERY 8 HOURS PRN
Status: DISCONTINUED | OUTPATIENT
Start: 2020-07-20 | End: 2020-07-20 | Stop reason: HOSPADM

## 2020-07-20 RX ORDER — OXYBUTYNIN CHLORIDE 5 MG/1
5 TABLET ORAL 3 TIMES DAILY PRN
Qty: 30 TABLET | Refills: 0 | Status: SHIPPED | OUTPATIENT
Start: 2020-07-20 | End: 2020-07-27 | Stop reason: SDUPTHER

## 2020-07-20 RX ORDER — OXYBUTYNIN CHLORIDE 5 MG/1
5 TABLET ORAL 3 TIMES DAILY PRN
Status: DISCONTINUED | OUTPATIENT
Start: 2020-07-20 | End: 2020-07-20 | Stop reason: HOSPADM

## 2020-07-20 RX ADMIN — ATROPA BELLADONNA AND OPIUM 30 MG: 16.2; 3 SUPPOSITORY RECTAL at 11:18

## 2020-07-20 RX ADMIN — TAMSULOSIN HYDROCHLORIDE 0.4 MG: 0.4 CAPSULE ORAL at 09:51

## 2020-07-20 RX ADMIN — CEFAZOLIN SODIUM 1 G: 1 INJECTION, SOLUTION INTRAVENOUS at 09:52

## 2020-07-20 RX ADMIN — CEFAZOLIN SODIUM 1 G: 1 INJECTION, SOLUTION INTRAVENOUS at 01:33

## 2020-07-20 RX ADMIN — OXYBUTYNIN CHLORIDE 5 MG: 5 TABLET ORAL at 09:50

## 2020-07-20 RX ADMIN — SODIUM CHLORIDE, PRESERVATIVE FREE 10 ML: 5 INJECTION INTRAVENOUS at 09:58

## 2020-07-20 ASSESSMENT — PAIN SCALES - WONG BAKER: WONGBAKER_NUMERICALRESPONSE: 0

## 2020-07-20 ASSESSMENT — PAIN DESCRIPTION - PROGRESSION: CLINICAL_PROGRESSION: NOT CHANGED

## 2020-07-20 NOTE — DISCHARGE SUMMARY
Hospital Medicine Discharge Summary      Patient Identification:   Jack Bernardo   : 1955  MRN: [id number]   Account: [de-identified]      Patient's PCP: Delroy Oppenheim, MD    Admit Date: 2020     Discharge Date:   2020    Admitting Physician: Gaudencio Kaba MD     Discharging Nurse Practitioner: ZAHIRA Narvaez - CNP     Discharge Diagnoses with Assessment/Plan:  1. 5 mm calculus at the base of the urinary bladder at the ureterovesicular junction S/P cystoscopy with right ureteroscopy, stone basket retrieval and placement of right ureteral stent on 2020--appreciate urology input; plan is 2-week follow-up with a KUB and renal ultrasound for possible stent removal per urology; was started on Ditropan, B&O suppository, Flomax  2. Moderate to severe right hydronephrotic changes--see #1  3. Interval increasing right perinephric fluid--see #1  4. KB--resolved; likely secondary to #1 and she has been voiding well     The patient was seen and examined on day of discharge and this discharge summary is in conjunction with any daily progress note from day of discharge. Hospital Course:   Jack Bernardo is a 63 y.o. female admitted to [address] [place] [address] on 2020 for right-sided flank pain; Patient has a past medical history of kidney stones; she was seen in the emergency department on July 15 and diagnosed with a ureteral stone and was discharged with conservative management; she was doing well until last evening her pain became severe to her right flank; she presented to the emergency department and the case was discussed with the urologist who wanted her admitted for further management of the stone; today she underwent cystoscopy with right ureteroscopy, stone basket retrieval and placement of right ureteral stent and is doing quite well.     --> approximately midnight patient developed some urinary urgency so Ditropan was ordered with some relief but not complete bilaterally       Labs: For convenience and continuity at follow-up the following most recent labs are provided:      CBC:    Lab Results   Component Value Date    WBC 6.8 07/19/2020    HGB 12.9 07/19/2020    HCT 40.6 07/19/2020     07/19/2020       Renal:    Lab Results   Component Value Date     07/19/2020    K 4.4 07/19/2020     07/19/2020    CO2 21 07/19/2020    BUN 19 07/19/2020    CREATININE 1.0 07/19/2020    CALCIUM 8.5 07/19/2020       Cardiac: No results for input(s): Glean Moravian in the last 72 hours. Significant Diagnostic Studies    Radiology:   CT ABDOMEN PELVIS WO CONTRAST Additional Contrast? None   Final Result   . 1. Visualized 5 mm calculus now seen at the base of the urinary bladder at the ureterovesicular junction. The previously noted 4.7 mm stone may also be present immediately adjacent to the first mentioned. 2. Interval increasing right perinephric fluid. 3. Moderate severe right hydronephrotic changes. 4. Visualized left renal calculi stable compared to prior study. **This report has been created using voice recognition software. It may contain minor errors which are inherent in voice recognition technology. **      Final report electronically signed by Dr. Pancho Dee on 7/19/2020 12:52 AM             Consults:     [facility] TO UROLOGY    Disposition:    [x] Home       [] TCU       [] Rehab       [] Psych       [] SNF       [] [place]       [] Other-    Condition at Discharge: Stable    Code Status:  Full Code     Pending tests at discharge: none    Patient Instructions:    Discharge lab work: Gonzalez Helms and renal ultrasound per urology  Activity: activity as tolerated  Diet: DIET GENERAL;      Follow-up visits:   Delroy Oppenheim, MD  [address] [place], [place] [id number]  [facility] [address], MD  [address] [phone]    In 2 weeks  renal us, kub prior, possible stent removal.         Discharge Medications:      Tyra Render   Home Medication Instructions VIDHI:[id number]    Printed on:07/20/20 1207   Medication Information                      cefdinir (OMNICEF) 300 MG capsule  Take 1 capsule by mouth 2 times daily for 5 days             ketorolac (TORADOL) 10 MG tablet  Take 1 tablet by mouth every 6 hours as needed for Pain             Multiple Vitamins-Minerals (MULTIVITAMIN ADULT PO)  Take by mouth             oxybutynin (DITROPAN) 5 MG tablet  Take 1 tablet by mouth 3 times daily as needed (bladder spasms, urgency)             tamsulosin (FLOMAX) 0.4 MG capsule  Take 1 capsule by mouth daily for 5 doses                 Time Spent on discharge is more than 45 minutes in the examination, evaluation, counseling and review of medications and discharge plan. Signed: Thank you Erick Carlson MD for the opportunity to be involved in this patient's care.     Electronically signed by ZAHIRA Alejandre CNP on 7/20/2020 at 12:09 PM

## 2020-07-20 NOTE — PROCEDURES
A Bladder scan was performed at 1130 . The patient's last void was at 1110 . The residual amount was measured to be 75 ML. Report of results was given to Lennie HINES.

## 2020-07-20 NOTE — TELEPHONE ENCOUNTER
Pt had right works with Dr Gill Aguero this weekend. Needs KUB and Renal US in 2 weeks, had possible forniceal rupture, per dr Sadaf Chavez. And possible stent removal, schedule with Dr Grace Frausto    Please coordinate. Hopeful discharge today.      Thanks

## 2020-07-20 NOTE — FLOWSHEET NOTE
07/20/20 0753   Provider Notification   Reason for Communication Review case   Provider Name V Pingle   Provider Notification Advance Practice Clinician (CNS, NP, CNM, CRNA, PA)   Method of Communication Secure Message   Response Waiting for response   Notification Time [phone]   bonita jessica patient is having an issue with urgency that started around midnight last night. Is there something we can give her for that ? B&O?

## 2020-07-20 NOTE — CARE COORDINATION
7/20/20, 11:22 AM EDT  DISCHARGE PLANNING EVALUATION:    Alton Muñiz       Admitted from: ER 7/18/2020/ Debi Arenas 446 day: 1   Location: -30/030-A Reason for admit: Ureteral stone [N20.1] Status: Inpt  Admit order signed?: yes  PMH:  has a past medical history of Kidney stones. Procedure: 7-19-20 per Dr. Melina Richards  cystoscopy with right ureteroscopy,stone basket retrieval and placement of right ureteral stent with fluoroscopic guidance. Pertinent abnormal Imaging: CT Abd/Pelvis  1. Visualized 5 mm calculus now seen at the base of the urinary bladder at the ureterovesicular junction. The previously noted 4.7 mm stone may also be present immediately adjacent to the first mentioned. 2. Interval increasing right perinephric fluid. 3. Moderate severe right hydronephrotic changes. 4. Visualized left renal calculi stable compared to prior study. Medications:  Scheduled Meds:   tamsulosin  0.4 mg Oral Daily    sodium chloride flush  10 mL Intravenous 2 times per day    enoxaparin  40 mg Subcutaneous Daily    famotidine (PEPCID) injection  20 mg Intravenous Daily    ketorolac  15 mg Intravenous Q6H    ceFAZolin  1 g Intravenous Q8H     Continuous Infusions:   sodium chloride 125 mL/hr at 07/19/20 2126    sodium chloride 150 mL/hr at 07/19/20 0405      Pertinent Info/Orders/Treatment Plan: To ER with flank pain. Kidney stones. Procedure yesterday with Dr. Melina Richards. Ambulates halls. Discomfort due to stent. Diet: DIET GENERAL;   Smoking status:  reports that she has never smoked.  She has never used smokeless tobacco.   PCP: Darylene Robert, MD  Readmission 30 days or less: No  Readmission Risk Score: 6%    Discharge Planning Evaluation  Current Residence:  Private Residence  Living Arrangements:  Alone   Support Systems:  Family Members  Current Services PTA:     Potential Assistance Needed:  N/A  Potential Assistance Purchasing Medications:  No  Does patient want to participate in local refill/ meds to beds program?  No  Type of Home Care Services:  None  Patient expects to be discharged to:  home  Expected Discharge date:  07/22/20  Follow Up Appointment: Best Day/ Time: Monday PM    Patient Goals/Plan/Treatment Preferences: Met with pt today, her daughter from [address] is present. They have been ambulating halls. Pt is from home alone. She is very active and self sufficient. She has no services or DME. She has a PCP, she drives and denies issues with getting meds or basic needs. Transportation/Food Security/Housekeeping Addressed:  No issues identified.  Evaluation: no     7/20/20, 11:29 AM EDT    Patient goals/plan/ treatment preferences discussed by  and . Patient goals/plan/ treatment preferences reviewed with patient/ family. Patient/ family verbalize understanding of discharge plan and are in agreement with goal/plan/treatment preferences. Understanding was demonstrated using the teach back method. AVS provided by RN at time of discharge, which includes all necessary medical information pertaining to the patients current course of illness, treatment, post-discharge goals of care, and treatment preferences. Pt denies home going needs.

## 2020-07-20 NOTE — TELEPHONE ENCOUNTER
Patient scheduled for Renal US at [facility] on 8/4/20 @ 6:30 am. No carbonated beverages and arrive with a full bladder. Patient will  Have the KUB done a that time also. Follow up appointment made for 8/6/20 @ 11:45 am. Patient informed and voiced understanding.

## 2020-07-20 NOTE — PROGRESS NOTES
Urology Progress Note    Chief Complaint: flank pain    Subjective:     Patient is resting in bed, voiding spontaneously, +flatus,  ambulating with assistance, tolerating regular diet, denies any nausea or vomiting. There are complaints of no pain at this time. Denies fever. Having some urgency.              Vitals:  /61   Pulse 61   Temp 98.3 °F (36.8 °C) (Oral)   Resp 14   Ht 5' 6\" (1.676 m)   Wt 119 lb 2 oz (54 kg)   LMP 2017   SpO2 97%   BMI 19.23 kg/m²   Temp  Av.9 °F (36.6 °C)  Min: 97.4 °F (36.3 °C)  Max: 98.3 °F (36.8 °C)    Intake/Output Summary (Last 24 hours) at 2020 0840  Last data filed at 2020 0406  Gross per 24 hour   Intake 4511.12 ml   Output 205 ml   Net 4306.12 ml       Social History     Socioeconomic History    Marital status:      Spouse name: Not on file    Number of children: Not on file    Years of education: Not on file    Highest education level: Not on file   Occupational History    Not on file   Social Needs    Financial resource strain: Not on file    Food insecurity     Worry: Not on file     Inability: Not on file   [employer] needs     Medical: Not on file     Non-medical: Not on file   Tobacco Use    Smoking status: Never Smoker    Smokeless tobacco: Never Used   Substance and Sexual Activity    Alcohol use: Yes     Comment: rare    Drug use: No    Sexual activity: Not on file   Lifestyle    Physical activity     Days per week: Not on file     Minutes per session: Not on file    Stress: Not on file   Relationships    Social connections     Talks on phone: Not on file     Gets together: Not on file     Attends [employer] service: Not on file     Active member of club or organization: Not on file     Attends meetings of clubs or organizations: Not on file     Relationship status: Not on file    Intimate partner violence     Fear of current or ex partner: Not on file     Emotionally abused: Not on file     Physically abused: Not on file     Forced sexual activity: Not on file   Other Topics Concern    Not on file   Social History Narrative    Not on file     Family History   Problem Relation Age of Onset    No Known Problems Mother      No Known Allergies      Constitutional: Alert and oriented times x3, no acute distress, and cooperative to examination with appropriate mood and affect. HEENT:   Head:         Normocephalic and atraumatic. Mucous membranes are normal.   Eyes:         EOM are normal. No scleral icterus. Nose:    The external appearance of the nose is normal  Ears: The ears appear normal to external inspection. Cardiovascular:       Normal rate, regular rhythm. Pulmonary/Chest:  Normal respiratory rate and rhthym. No use of accessory muscles. Lungs clear bilaterally. Abdominal:          Soft. No tenderness. Active bowel sounds. Musculoskeletal:    Normal range of motion. she exhibits no edema or tenderness of lower extremities. Extremities:    No cyanosis, clubbing, or edema present. Neurological:    Alert and oriented. Labs:  WBC:    Lab Results   Component Value Date    WBC 6.8 07/19/2020     Hemoglobin/Hematocrit:    Lab Results   Component Value Date    HGB 12.9 07/19/2020    HCT 40.6 07/19/2020     BMP:    Lab Results   Component Value Date     07/19/2020    K 4.4 07/19/2020     07/19/2020    CO2 21 07/19/2020    BUN 19 07/19/2020    LABALBU 4.6 07/18/2020    CREATININE 1.0 07/19/2020    CALCIUM 8.5 07/19/2020    LABGLOM 56 07/19/2020       Impression:    Right flank pain  Right distal stones  Right hydronephrosis with perinephric fluid collection  Left renal stone     Plan:    POD # 1 cystoscopy with right ureteroscopy,stone basket retrieval and placement of right ureteral stent with fluoroscopic guidance by Dr. Nadine Youssef on 7-. Pt doing well post op. She does report some urgency starting last night. No significant flank pain.   No fever or chills over night.     Follow up in 2 weeks with a KUB and renal ultrasound for possible stent removal. With Dr Shelby Rios. Will send with oxybutynin, pain medication, and antibiotic.     Doc Chaney, ZAHIRA  07/20/20 8:40 AM  Urology

## 2020-07-21 ENCOUNTER — TELEPHONE (OUTPATIENT)
Dept: FAMILY MEDICINE CLINIC | Age: 65
End: 2020-07-21

## 2020-07-22 NOTE — TELEPHONE ENCOUNTER
TCM Interactive Patient Contact:  Was patient able to fill all prescriptions: Yes  Was patient instructed to bring all medications to the follow-up visit: Yes  Is patient taking all medications as directed in the discharge summary? Yes  Does patient understand their discharge instructions: Yes  Does patient have questions or concerns that need addressed prior to 7-14 day follow up office visit: Yes, see below. Spoke to the pt and she states that she is doing well but is having some constipation. Miralax recommended and the pt verbalized understanding. Pt declines appt in the office, will f/up with specialists as scheduled.        Follow Up  Future Appointments   Date Time Provider Sudha Alan   8/4/2020  7:00 AM STR ULTRASOUND RM 2 STRZ US STR Radiolog   8/6/2020 11:45 AM Jose E Jang MD [facility]   6/8/2021  7:30 AM STR ULTRASOUND RM 2 STRZ US STR Radiolog   6/17/2021  1:30 PM Jose E Jang MD [facility] [facility] - [address], [address] [place] [address] Ave ([address] [place] [address])

## 2020-07-24 LAB — STONE ANALYSIS: NORMAL

## 2020-07-27 ENCOUNTER — TELEPHONE (OUTPATIENT)
Dept: UROLOGY | Age: 65
End: 2020-07-27

## 2020-07-27 RX ORDER — OXYBUTYNIN CHLORIDE 5 MG/1
5 TABLET ORAL 3 TIMES DAILY PRN
Qty: 30 TABLET | Refills: 0 | Status: SHIPPED | OUTPATIENT
Start: 2020-07-27 | End: 2020-08-29

## 2020-08-04 ENCOUNTER — HOSPITAL ENCOUNTER (OUTPATIENT)
Age: 65
Discharge: HOME | End: 2020-08-04
Payer: COMMERCIAL

## 2020-08-04 ENCOUNTER — HOSPITAL ENCOUNTER (OUTPATIENT)
Dept: ULTRASOUND IMAGING | Age: 65
Discharge: HOME | End: 2020-08-04
Payer: COMMERCIAL

## 2020-08-04 ENCOUNTER — HOSPITAL ENCOUNTER (OUTPATIENT)
Dept: GENERAL RADIOLOGY | Age: 65
Discharge: HOME | End: 2020-08-04
Payer: COMMERCIAL

## 2020-08-04 PROCEDURE — 76775 US EXAM ABDO BACK WALL LIM: CPT

## 2020-08-04 PROCEDURE — 74018 RADEX ABDOMEN 1 VIEW: CPT

## 2020-08-06 ENCOUNTER — OFFICE VISIT (OUTPATIENT)
Dept: UROLOGY | Age: 65
End: 2020-08-06
Payer: COMMERCIAL

## 2020-08-06 VITALS — HEIGHT: 66 IN | TEMPERATURE: 97.4 F | BODY MASS INDEX: 18.48 KG/M2 | WEIGHT: 115 LBS

## 2020-08-06 LAB
BILIRUBIN URINE: NEGATIVE
BLOOD URINE, POC: ABNORMAL
CHARACTER, URINE: CLEAR
COLOR, URINE: YELLOW
GLUCOSE URINE: NEGATIVE MG/DL
KETONES, URINE: NEGATIVE
LEUKOCYTE CLUMPS, URINE: ABNORMAL
NITRITE, URINE: NEGATIVE
PH, URINE: 6.5 (ref 5–9)
PROTEIN, URINE: NEGATIVE MG/DL
SPECIFIC GRAVITY, URINE: 1.01 (ref 1–1.03)
UROBILINOGEN, URINE: 0.2 EU/DL (ref 0–1)

## 2020-08-06 PROCEDURE — G8420 CALC BMI NORM PARAMETERS: HCPCS | Performed by: UROLOGY

## 2020-08-06 PROCEDURE — 1036F TOBACCO NON-USER: CPT | Performed by: UROLOGY

## 2020-08-06 PROCEDURE — 3017F COLORECTAL CA SCREEN DOC REV: CPT | Performed by: UROLOGY

## 2020-08-06 PROCEDURE — 1111F DSCHRG MED/CURRENT MED MERGE: CPT | Performed by: UROLOGY

## 2020-08-06 PROCEDURE — 99214 OFFICE O/P EST MOD 30 MIN: CPT | Performed by: UROLOGY

## 2020-08-06 PROCEDURE — 81003 URINALYSIS AUTO W/O SCOPE: CPT | Performed by: UROLOGY

## 2020-08-06 PROCEDURE — 52310 CYSTOSCOPY AND TREATMENT: CPT | Performed by: UROLOGY

## 2020-08-06 PROCEDURE — G8427 DOCREV CUR MEDS BY ELIG CLIN: HCPCS | Performed by: UROLOGY

## 2020-08-06 NOTE — PROGRESS NOTES
Anel Zarco MD  Urology Clinic Progress Note      Patient:  Rajeev Pete  YOB: 1955  Date: 8/6/2020    HISTORY OF PRESENT ILLNESS:   The patient is a 63 y.o. female who presents today for follow-up for the following problem(s):      1. Kidney stones    2. Asymptomatic microscopic hematuria    3. Renal cyst         Overall the problem(s) : are improving  Associated Symptoms: No dysuria, gross hematuria. Pain Severity:      Summary of old records: Previously had microscopic hematuria work-up with CT scan (bilateral non obstructing stones which was negative. 5/2019  1. Nonobstructive nephrolithiasis bilaterally. 2. Left-sided renal cysts. 3. Moderate retained stool. There is a 6 mm hyperdense stone at the interpolar region of the right kidney and a punctate 2 mm hyperdense stone at the inferior pole of the right kidney. There are multiple    small hyperdense stones in the left kidney the largest a 2 mm stone at the inferior pole. There is a 6 mm hypodense cyst at the superior pole of the left kidney. There is another 6 mm hypodense cyst at the superior pole more inferiorly. There is a 1 cm    hypodense cyst at the interpolar region of the left kidney. Additional History: Onset 1 to 2 years  Severity is described as mild-moderate. The course of symptoms over time is chronic. Alleviating factors: none  Worsening factors: none  Lower urinary tract symptoms: None  No flank pains  No hematuria  No UTIs   7/19/2020 went into ER for right ureteral stone and flank pain, and calyceal rupture  Cysto right distal ureteroscopy and HLL and stent placement by lady  Here for stent removal  Discussed her CT scan findings and the stones on the left side. She denies any left-sided flank pain.     I independently reviewed and verified the images and reports from:    Us Renal Complete    Result Date: 5/27/2020  PROCEDURE: US RENAL COMPLETE CLINICAL INFORMATION: Renal cyst . COMPARISON: No prior study. TECHNIQUE: Grayscale and color Doppler ultrasound FINDINGS: Right: Right kidney is normal in size and echogenicity. There is a 0.7 x 0.6 cm interpolar echogenic focus with posterior shadowing compatible with a calculus. Right renal pelvis measures 1.3 cm. There is mild right renal cortical thinning. The resistive index is normal. Left: There are 2 separate echogenic foci within the left kidney 0.4 cm x 0.3 cm and 0.4 cm x 0.2 cm. Also and interpolar 1.2 cm x 0.8 cm sonolucent structure with posterior enhancement. No renal cortical thinning. No hydronephrosis. Resistive index is normal. RIGHT KIDNEY - 10.8 x 5.3 x 3.8 cm Resistive Index - 0.58 Cortical Thickness - 0.83 cm LEFT KIDNEY - 10.0 x 4.2 x 4.7 cm Resistive Index - 0.54 Cortical Thickness - 1.3 cm URINARY BLADDER Pre-Void - 51.3 mL Post-Void - 4.74 mL     Bilateral nonobstructive nephrolithiasis. Suggestion of right renal cortical thinning. Otherwise stable exam. **This report has been created using voice recognition software. It may contain minor errors which are inherent in voice recognition technology. ** Final report electronically signed by Dr. Ky Kapoor on 5/27/2020 8:54 AM      Imaging Reviewed during this Office Visit:   (results were independently reviewed by physician and radiology report verified)    Urinalysis today:  Results for POC orders placed in visit on 08/06/20   POCT Urinalysis No Micro (Auto)   Result Value Ref Range    Glucose, Ur Negative NEGATIVE mg/dl    Bilirubin Urine Negative     Ketones, Urine Negative NEGATIVE    Specific Gravity, Urine 1.010 1.002 - 1.03    Blood, UA POC Large (A) NEGATIVE    pH, Urine 6.50 5.0 - 9.0    Protein, Urine Negative NEGATIVE mg/dl    Urobilinogen, Urine 0.20 0.0 - 1.0 eu/dl    Nitrite, Urine Negative NEGATIVE    Leukocyte Clumps, Urine Trace (A) NEGATIVE    Color, Urine Yellow YELLOW-STR    Character, Urine Clear [id number]       Last BUN and creatinine:  Lab Results   Component Value Date    BUN 19 07/19/2020     Lab Results   Component Value Date    CREATININE 1.0 07/19/2020       PAST MEDICAL, FAMILY AND SOCIAL HISTORY UPDATE:  Past Medical History:   Diagnosis Date    Kidney stones      Past Surgical History:   Procedure Laterality Date    CYSTOSCOPY Right 7/19/2020    CYSTOSCOPY, RIGHT URETEROSCOPY, BASKET RETRIEVAL OF STONE, AND RIGHT URETERAL STENT INSERTION performed by Ginny Rich MD at [address] [facility]       Family History   Problem Relation Age of Onset    No Known Problems Mother      Outpatient Medications Marked as Taking for the 8/6/20 encounter (Office Visit) with Yesenia Chaudhari MD   Medication Sig Dispense Refill    oxybutynin (DITROPAN) 5 MG tablet Take 1 tablet by mouth 3 times daily as needed (bladder spasms, urgency) 30 tablet 0    Multiple Vitamins-Minerals (MULTIVITAMIN ADULT PO) Take by mouth         Patient has no known allergies. Social History     Tobacco Use   Smoking Status Never Smoker   Smokeless Tobacco Never Used       Social History     Substance and Sexual Activity   Alcohol Use Yes    Comment: rare       REVIEW OF SYSTEMS:  Constitutional: negative  Eyes: negative  Respiratory: negative  Cardiovascular: negative  Gastrointestinal: negative  Genitourinary: negative except for what is in HPI  Musculoskeletal: negative  Skin: negative   Neurological: negative  Hematological/Lymphatic: negative  Psychological: negative    Physical Exam:      Vitals:    08/06/20 1143   Temp: 97.4 °F (36.3 °C)     Patient is a 63 y.o. female in no acute distress and alert and oriented to person, place and time. NAD, Alert  Non labored respiration      Assessment and Plan      1. Kidney stones    2. Asymptomatic microscopic hematuria    3. Renal cyst           Plan:       Cystoscopy Operative Note    Findings:   The patient was prepped and draped in the usual sterile fashion.   The flexible cystoscope was advanced through the urethra and into the bladder. The bladder was thoroughly inspected and the following was noted:    Urethra: normal appearing urethra with no masses, tenderness or lesions  Bladder: right stent removed intact    The cystoscope was removed. The patient tolerated the procedure well. Kidney stones. Patient instructed to drink at least 10 eight ounce glasses of water a day and avoid excessive consumption of sodium and animal protein to decrease risk of recurrent kidney stones. KUB in 1 year  She would like conservative management for the left-sided kidney stones for now. Return in about 1 year (around 8/6/2021).              Leanne Persaud MD  [facility]

## 2020-08-29 ENCOUNTER — HOSPITAL ENCOUNTER (EMERGENCY)
Age: 65
Discharge: HOME | End: 2020-08-29
Payer: COMMERCIAL

## 2020-08-29 VITALS
HEIGHT: 66 IN | WEIGHT: 115 LBS | SYSTOLIC BLOOD PRESSURE: 111 MMHG | BODY MASS INDEX: 18.48 KG/M2 | DIASTOLIC BLOOD PRESSURE: 72 MMHG | TEMPERATURE: 97.8 F | RESPIRATION RATE: 16 BRPM | HEART RATE: 72 BPM | OXYGEN SATURATION: 97 %

## 2020-08-29 PROCEDURE — 99212 OFFICE O/P EST SF 10 MIN: CPT

## 2020-08-29 PROCEDURE — 99213 OFFICE O/P EST LOW 20 MIN: CPT | Performed by: NURSE PRACTITIONER

## 2020-08-29 RX ORDER — GENTAMICIN SULFATE 3 MG/ML
2 SOLUTION/ DROPS OPHTHALMIC 2 TIMES DAILY
Qty: 5 ML | Refills: 0 | Status: SHIPPED | OUTPATIENT
Start: 2020-08-29 | End: 2020-09-08

## 2020-08-29 RX ORDER — PROPARACAINE HYDROCHLORIDE 5 MG/ML
2 SOLUTION/ DROPS OPHTHALMIC ONCE
Status: DISCONTINUED | OUTPATIENT
Start: 2020-08-29 | End: 2020-08-29 | Stop reason: HOSPADM

## 2020-08-29 RX ORDER — PROPARACAINE HYDROCHLORIDE 5 MG/ML
SOLUTION/ DROPS OPHTHALMIC
Status: DISCONTINUED
Start: 2020-08-29 | End: 2020-08-29 | Stop reason: HOSPADM

## 2020-08-29 ASSESSMENT — ENCOUNTER SYMPTOMS
EYE REDNESS: 1
PHOTOPHOBIA: 0
EYE ITCHING: 1
EYE DISCHARGE: 0
EYE PAIN: 1

## 2020-08-29 ASSESSMENT — VISUAL ACUITY
OU: 1
OS: 20/25
OU: 20/20
OD: 20/20

## 2020-08-29 ASSESSMENT — PAIN DESCRIPTION - PAIN TYPE: TYPE: ACUTE PAIN

## 2020-08-29 ASSESSMENT — PAIN DESCRIPTION - LOCATION: LOCATION: EYE

## 2020-08-29 ASSESSMENT — PAIN DESCRIPTION - ORIENTATION: ORIENTATION: RIGHT

## 2020-08-29 ASSESSMENT — PAIN SCALES - GENERAL: PAINLEVEL_OUTOF10: 7

## 2020-08-29 NOTE — ED TRIAGE NOTES
Pt complains of right eye irritation for 2 days not getting better. States it is painful. Pt denies vision trouble.

## 2020-08-29 NOTE — ED PROVIDER NOTES
[place]  Urgent Care Encounter       CHIEF COMPLAINT       Chief Complaint   Patient presents with    Eye Problem     right       Nurses Notes reviewed and I agree except as noted in the HPI. HISTORY OF PRESENT ILLNESS   Marie Schumacher is a 63 y.o. female who presents     Patient is present in the urgent care, for evaluation of right eye irritation, lasting for approximately 2 days. She believes that she may have foreign body, or scratched eye. Denies any use of contact lens. REVIEW OF SYSTEMS     Review of Systems   Constitutional: Negative for chills, fatigue and fever. HENT: Negative for congestion. Eyes: Positive for pain, redness and itching. Negative for photophobia, discharge and visual disturbance. Skin: Negative for rash. Neurological: Negative for dizziness, weakness, light-headedness, numbness and headaches. PAST MEDICAL HISTORY         Diagnosis Date    Kidney stones        SURGICALHISTORY     Patient  has a past surgical history that includes [place] tooth extraction and Cystoscopy (Right, 7/19/2020). CURRENT MEDICATIONS       Discharge Medication List as of 8/29/2020 10:09 AM      CONTINUE these medications which have NOT CHANGED    Details   Multiple Vitamins-Minerals (MULTIVITAMIN ADULT PO) Take by mouthHistorical Med             ALLERGIES     Patient is has No Known Allergies. Patients   Immunization History   Administered Date(s) Administered    Tdap (Boostrix, Adacel) 02/27/2019       FAMILY HISTORY     Patient's family history includes No Known Problems in her mother. SOCIAL HISTORY     Patient  reports that she has never smoked. She has never used smokeless tobacco. She reports current alcohol use. She reports that she does not use drugs.     PHYSICAL EXAM     ED TRIAGE VITALS  BP: 111/72, Temp: 97.8 °F (36.6 °C), Pulse: 72, Resp: 16, SpO2: 97 %,Estimated body mass index is 18.56 kg/m² as calculated from the following: Height as of this encounter: 5' 6\" (1.676 m). Weight as of this encounter: 115 lb (52.2 kg). ,Patient's last menstrual period was 11/16/2017. Physical Exam  Constitutional:       General: She is not in acute distress. Appearance: Normal appearance. She is not ill-appearing, toxic-appearing or diaphoretic. Eyes:      General: Lids are normal. Vision grossly intact. Scleral icterus (right eye) present. Right eye: No discharge. Left eye: No discharge. Extraocular Movements: Extraocular movements intact. Conjunctiva/sclera: Conjunctivae normal.      Pupils: Pupils are equal, round, and reactive to light. Pupils are equal.      Right eye: Pupil is round, reactive and not sluggish. No corneal abrasion or fluorescein uptake. Left eye: Pupil is round, reactive and not sluggish. Musculoskeletal: Normal range of motion. Skin:     General: Skin is warm. Neurological:      General: No focal deficit present. Mental Status: She is alert and oriented to person, place, and time. Sensory: No sensory deficit. Psychiatric:         Mood and Affect: Mood normal.         Behavior: Behavior normal.         Thought Content: Thought content normal.         Judgment: Judgment normal.         DIAGNOSTIC RESULTS     Labs:No results found for this visit on 08/29/20. IMAGING:    No orders to display     URGENT CARE COURSE:     Vitals:    08/29/20 0945   BP: 111/72   Pulse: 72   Resp: 16   Temp: 97.8 °F (36.6 °C)   SpO2: 97%   Weight: 115 lb (52.2 kg)   Height: 5' 6\" (1.676 m)       Medications   proparacaine (ALCAINE) 0.5 % ophthalmic solution 2 drop (has no administration in time range)   proparacaine (ALCAINE) 0.5 % ophthalmic solution (has no administration in time range)            PROCEDURES:  None    FINAL IMPRESSION      1. Foreign body of right external eye, initial encounter    2.  Irritation of right eye          DISPOSITION/ PLAN   Patient is discharged home with

## 2020-11-02 ENCOUNTER — TELEPHONE (OUTPATIENT)
Dept: FAMILY MEDICINE CLINIC | Age: 65
End: 2020-11-02

## 2020-11-02 NOTE — TELEPHONE ENCOUNTER
Should consider testing if she had close contact with a positive case (<6ft for more than 15 minutes). If there is a question about whether there's close contact, ok for VV.  CG

## 2020-11-02 NOTE — TELEPHONE ENCOUNTER
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 She would like to know if she needs tested? Should be be going to her other clients houses?  Pt feels fine   [phone]

## 2021-02-09 ENCOUNTER — HOSPITAL ENCOUNTER (OUTPATIENT)
Dept: WOMENS IMAGING | Age: 66
Discharge: HOME | End: 2021-02-09
Payer: COMMERCIAL

## 2021-02-09 DIAGNOSIS — Z12.31 VISIT FOR SCREENING MAMMOGRAM: ICD-10-CM

## 2021-02-09 PROCEDURE — 77063 BREAST TOMOSYNTHESIS BI: CPT

## 2021-06-08 ENCOUNTER — HOSPITAL ENCOUNTER (OUTPATIENT)
Dept: ULTRASOUND IMAGING | Age: 66
Discharge: HOME | End: 2021-06-08
Payer: COMMERCIAL

## 2021-06-08 DIAGNOSIS — N28.1 RENAL CYST: ICD-10-CM

## 2021-06-08 PROCEDURE — 76770 US EXAM ABDO BACK WALL COMP: CPT

## 2021-06-15 ENCOUNTER — HOSPITAL ENCOUNTER (OUTPATIENT)
Age: 66
Discharge: HOME | End: 2021-06-15
Payer: COMMERCIAL

## 2021-06-15 ENCOUNTER — HOSPITAL ENCOUNTER (OUTPATIENT)
Dept: GENERAL RADIOLOGY | Age: 66
Discharge: HOME | End: 2021-06-15
Payer: COMMERCIAL

## 2021-06-15 DIAGNOSIS — N20.0 KIDNEY STONES: ICD-10-CM

## 2021-06-15 PROCEDURE — 74018 RADEX ABDOMEN 1 VIEW: CPT

## 2021-06-17 ENCOUNTER — OFFICE VISIT (OUTPATIENT)
Dept: UROLOGY | Age: 66
End: 2021-06-17
Payer: COMMERCIAL

## 2021-06-17 VITALS — WEIGHT: 115 LBS | BODY MASS INDEX: 18.48 KG/M2 | RESPIRATION RATE: 18 BRPM | HEIGHT: 66 IN

## 2021-06-17 DIAGNOSIS — N28.1 RENAL CYST: ICD-10-CM

## 2021-06-17 DIAGNOSIS — N20.0 KIDNEY STONES: Primary | ICD-10-CM

## 2021-06-17 DIAGNOSIS — R31.21 ASYMPTOMATIC MICROSCOPIC HEMATURIA: ICD-10-CM

## 2021-06-17 PROCEDURE — G8427 DOCREV CUR MEDS BY ELIG CLIN: HCPCS | Performed by: UROLOGY

## 2021-06-17 PROCEDURE — G8420 CALC BMI NORM PARAMETERS: HCPCS | Performed by: UROLOGY

## 2021-06-17 PROCEDURE — 1036F TOBACCO NON-USER: CPT | Performed by: UROLOGY

## 2021-06-17 PROCEDURE — 3017F COLORECTAL CA SCREEN DOC REV: CPT | Performed by: UROLOGY

## 2021-06-17 PROCEDURE — 99213 OFFICE O/P EST LOW 20 MIN: CPT | Performed by: UROLOGY

## 2021-06-17 NOTE — PROGRESS NOTES
Fatemeh Higgins MD  Urology Clinic Progress Note      Patient:  Skye Nunez  YOB: 1955  Date: 6/17/2021    HISTORY OF PRESENT ILLNESS:   The patient is a 61 y.o. female who presents today for follow-up for the following problem(s):      1. Kidney stones    2. Asymptomatic microscopic hematuria    3. Renal cyst         Overall the problem(s) : are improving  Associated Symptoms: No dysuria, gross hematuria. Pain Severity:      Summary of old records: Previously had microscopic hematuria work-up with CT scan (bilateral non obstructing stones which was negative. 5/2019  1. Nonobstructive nephrolithiasis bilaterally. 2. Left-sided renal cysts. 3. Moderate retained stool. There is a 6 mm hyperdense stone at the interpolar region of the right kidney and a punctate 2 mm hyperdense stone at the inferior pole of the right kidney. There are multiple    small hyperdense stones in the left kidney the largest a 2 mm stone at the inferior pole. There is a 6 mm hypodense cyst at the superior pole of the left kidney. There is another 6 mm hypodense cyst at the superior pole more inferiorly. There is a 1 cm    hypodense cyst at the interpolar region of the left kidney. Additional History: Onset 1 to 2 years   7/19/2020 went into ER for right ureteral stone and flank pain, and calyceal rupture  Cysto right distal ureteroscopy and HLL and stent placement by lady  No flank pains, No hematuria, No UTIs    I independently reviewed and verified the images and reports from:    XR ABDOMEN (KUB) (SINGLE AP VIEW)    Result Date: 6/15/2021  PROCEDURE: XR ABDOMEN (KUB) (SINGLE AP VIEW) CLINICAL INFORMATION: Kidney stones COMPARISON: August 4, 2020 TECHNIQUE: 2 supine images of the abdomen were obtained. FINDINGS: No abnormally dilated bowel loops are seen. Moderate amount fecal material in the colon. The previously 2 small 2 to 3 mm faint calculi lower pole left kidney.  No other definite calculi are seen. . There are a few phleboliths in the pelvis. 2 small renal calculi left kidney. No acute findings. **This report has been created using voice recognition software. It may contain minor errors which are inherent in voice recognition technology. ** Final report electronically signed by Dr. Fischer Officer on 6/15/2021 3:07 PM      Us Renal Complete  Result Date: 5/27/2020  Bilateral nonobstructive nephrolithiasis. Suggestion of right renal cortical thinning. Otherwise stable exam. **This report has been created using voice recognition software. It may contain minor errors which are inherent in voice recognition technology. ** Final report electronically signed by Dr. Maikel Randhawa on 5/27/2020 8:54 AM      Imaging Reviewed during this Office Visit:   (results were independently reviewed by physician and radiology report verified)    Urinalysis today:  No results found for this visit on 06/17/21. Last BUN and creatinine:  Lab Results   Component Value Date    BUN 19 07/19/2020     Lab Results   Component Value Date    CREATININE 1.0 07/19/2020       PAST MEDICAL, FAMILY AND SOCIAL HISTORY UPDATE:  Past Medical History:   Diagnosis Date    Kidney stones      Past Surgical History:   Procedure Laterality Date    CYSTOSCOPY Right 7/19/2020    CYSTOSCOPY, RIGHT URETEROSCOPY, BASKET RETRIEVAL OF STONE, AND RIGHT URETERAL STENT INSERTION performed by Fadumo Boss MD at [address] [place] [address]       Family History   Problem Relation Age of Onset    No Known Problems Mother      Outpatient Medications Marked as Taking for the 6/17/21 encounter (Office Visit) with Nimesh Breen MD   Medication Sig Dispense Refill    Multiple Vitamins-Minerals (MULTIVITAMIN ADULT PO) Take by mouth         Patient has no known allergies.   Social History     Tobacco Use   Smoking Status Never Smoker   Smokeless Tobacco Never Used       Social History     Substance and Sexual Activity   Alcohol Use Yes    Comment: rare       REVIEW OF SYSTEMS:  Constitutional: negative  Eyes: negative  Respiratory: negative  Cardiovascular: negative  Gastrointestinal: negative  Genitourinary: negative except for what is in HPI  Musculoskeletal: negative  Skin: negative   Neurological: negative  Hematological/Lymphatic: negative  Psychological: negative    Physical Exam:      Vitals:    06/17/21 1337   Resp: 18     Patient is a 61 y.o. female in no acute distress and alert and oriented to person, place and time. NAD, Alert  Non labored respiration      Assessment and Plan      1. Kidney stones    2. Asymptomatic microscopic hematuria    3. Renal cyst           Plan:       KUB reviewed    Kidney stones. Left small stones. Discussed surgery or observation. Patient instructed to drink at least 10 eight ounce glasses of water a day and avoid excessive consumption of sodium and animal protein to decrease risk of recurrent kidney stones. KUB in 1 year  She would like conservative management for the left-sided kidney stones for now. Return in about 1 year (around 6/17/2022).              Nancy Reyes MD  [facility]

## 2021-11-12 ENCOUNTER — APPOINTMENT (OUTPATIENT)
Dept: CT IMAGING | Age: 66
End: 2021-11-12
Payer: COMMERCIAL

## 2021-11-12 ENCOUNTER — HOSPITAL ENCOUNTER (EMERGENCY)
Age: 66
Discharge: HOME | End: 2021-11-12
Attending: EMERGENCY MEDICINE
Payer: COMMERCIAL

## 2021-11-12 VITALS
TEMPERATURE: 97.6 F | HEIGHT: 66 IN | OXYGEN SATURATION: 100 % | SYSTOLIC BLOOD PRESSURE: 153 MMHG | RESPIRATION RATE: 16 BRPM | WEIGHT: 115 LBS | HEART RATE: 69 BPM | DIASTOLIC BLOOD PRESSURE: 97 MMHG | BODY MASS INDEX: 18.48 KG/M2

## 2021-11-12 DIAGNOSIS — R31.9 HEMATURIA, UNSPECIFIED TYPE: ICD-10-CM

## 2021-11-12 DIAGNOSIS — R10.9 ACUTE RIGHT FLANK PAIN: Primary | ICD-10-CM

## 2021-11-12 DIAGNOSIS — N20.1 URETERAL STONE: ICD-10-CM

## 2021-11-12 LAB
ALBUMIN SERPL-MCNC: 4.9 G/DL (ref 3.5–5.1)
ALP BLD-CCNC: 86 U/L (ref 38–126)
ALT SERPL-CCNC: 26 U/L (ref 11–66)
ANION GAP SERPL CALCULATED.3IONS-SCNC: 11 MEQ/L (ref 8–16)
AST SERPL-CCNC: 24 U/L (ref 5–40)
BASOPHILS # BLD: 0.7 %
BASOPHILS ABSOLUTE: 0 THOU/MM3 (ref 0–0.1)
BILIRUB SERPL-MCNC: 0.4 MG/DL (ref 0.3–1.2)
BILIRUBIN DIRECT: < 0.2 MG/DL (ref 0–0.3)
BILIRUBIN URINE: NEGATIVE
BLOOD, URINE: ABNORMAL
BUN BLDV-MCNC: 13 MG/DL (ref 7–22)
CALCIUM SERPL-MCNC: 9.9 MG/DL (ref 8.5–10.5)
CHARACTER, URINE: CLEAR
CHLORIDE BLD-SCNC: 107 MEQ/L (ref 98–111)
CO2: 26 MEQ/L (ref 23–33)
COLOR: YELLOW
CREAT SERPL-MCNC: 0.6 MG/DL (ref 0.4–1.2)
EOSINOPHIL # BLD: 1.9 %
EOSINOPHILS ABSOLUTE: 0.1 THOU/MM3 (ref 0–0.4)
ERYTHROCYTE [DISTWIDTH] IN BLOOD BY AUTOMATED COUNT: 13 % (ref 11.5–14.5)
ERYTHROCYTE [DISTWIDTH] IN BLOOD BY AUTOMATED COUNT: 47 FL (ref 35–45)
GFR SERPL CREATININE-BSD FRML MDRD: > 90 ML/MIN/1.73M2
GLUCOSE BLD-MCNC: 95 MG/DL (ref 70–108)
GLUCOSE URINE: NEGATIVE MG/DL
HCT VFR BLD CALC: 45.5 % (ref 37–47)
HEMOGLOBIN: 14.4 GM/DL (ref 12–16)
IMMATURE GRANS (ABS): 0.02 THOU/MM3 (ref 0–0.07)
IMMATURE GRANULOCYTES: 0.3 %
KETONES, URINE: NEGATIVE
LEUKOCYTE ESTERASE, URINE: NEGATIVE
LIPASE: 30.2 U/L (ref 5.6–51.3)
LYMPHOCYTES # BLD: 29.4 %
LYMPHOCYTES ABSOLUTE: 1.7 THOU/MM3 (ref 1–4.8)
MCH RBC QN AUTO: 31.2 PG (ref 26–33)
MCHC RBC AUTO-ENTMCNC: 31.6 GM/DL (ref 32.2–35.5)
MCV RBC AUTO: 98.5 FL (ref 81–99)
MONOCYTES # BLD: 9.2 %
MONOCYTES ABSOLUTE: 0.5 THOU/MM3 (ref 0.4–1.3)
NITRITE, URINE: NEGATIVE
NUCLEATED RED BLOOD CELLS: 0 /100 WBC
OSMOLALITY CALCULATION: 286.8 MOSMOL/KG (ref 275–300)
PH UA: 7 (ref 5–9)
PLATELET # BLD: 224 THOU/MM3 (ref 130–400)
PMV BLD AUTO: 9.1 FL (ref 9.4–12.4)
POTASSIUM SERPL-SCNC: 4.7 MEQ/L (ref 3.5–5.2)
PROTEIN UA: NEGATIVE MG/DL
RBC # BLD: 4.62 MILL/MM3 (ref 4.2–5.4)
SEG NEUTROPHILS: 58.5 %
SEGMENTED NEUTROPHILS ABSOLUTE COUNT: 3.4 THOU/MM3 (ref 1.8–7.7)
SODIUM BLD-SCNC: 144 MEQ/L (ref 135–145)
SPECIFIC GRAVITY UA: 1.01 (ref 1–1.03)
TOTAL PROTEIN: 7.3 G/DL (ref 6.1–8)
UROBILINOGEN, URINE: 0.2 EU/DL (ref 0.2–1)
WBC # BLD: 5.8 THOU/MM3 (ref 4.8–10.8)

## 2021-11-12 PROCEDURE — 81003 URINALYSIS AUTO W/O SCOPE: CPT

## 2021-11-12 PROCEDURE — 80053 COMPREHEN METABOLIC PANEL: CPT

## 2021-11-12 PROCEDURE — 36415 COLL VENOUS BLD VENIPUNCTURE: CPT

## 2021-11-12 PROCEDURE — 74176 CT ABD & PELVIS W/O CONTRAST: CPT

## 2021-11-12 PROCEDURE — 85025 COMPLETE CBC W/AUTO DIFF WBC: CPT

## 2021-11-12 PROCEDURE — 99214 OFFICE O/P EST MOD 30 MIN: CPT | Performed by: EMERGENCY MEDICINE

## 2021-11-12 PROCEDURE — 82248 BILIRUBIN DIRECT: CPT

## 2021-11-12 PROCEDURE — 99281 EMR DPT VST MAYX REQ PHY/QHP: CPT

## 2021-11-12 PROCEDURE — 83690 ASSAY OF LIPASE: CPT

## 2021-11-12 PROCEDURE — 99215 OFFICE O/P EST HI 40 MIN: CPT

## 2021-11-12 RX ORDER — SENNA AND DOCUSATE SODIUM 50; 8.6 MG/1; MG/1
1 TABLET, FILM COATED ORAL DAILY
Qty: 20 TABLET | Refills: 0 | Status: SHIPPED | OUTPATIENT
Start: 2021-11-12 | End: 2021-11-24

## 2021-11-12 RX ORDER — OXYCODONE HYDROCHLORIDE AND ACETAMINOPHEN 5; 325 MG/1; MG/1
1 TABLET ORAL EVERY 6 HOURS PRN
Qty: 20 TABLET | Refills: 0 | Status: SHIPPED | OUTPATIENT
Start: 2021-11-12 | End: 2021-11-17

## 2021-11-12 RX ORDER — NAPROXEN 250 MG/1
250 TABLET ORAL 2 TIMES DAILY WITH MEALS
Qty: 20 TABLET | Refills: 0 | Status: SHIPPED | OUTPATIENT
Start: 2021-11-12 | End: 2021-11-24

## 2021-11-12 ASSESSMENT — ENCOUNTER SYMPTOMS
SINUS PRESSURE: 0
EYE REDNESS: 0
CONSTIPATION: 0
BACK PAIN: 0
BLOOD IN STOOL: 0
EYE DISCHARGE: 0
NAUSEA: 0
DIARRHEA: 0
TROUBLE SWALLOWING: 0
VOMITING: 0
SORE THROAT: 0
SHORTNESS OF BREATH: 0
VOICE CHANGE: 0
STRIDOR: 0
FACIAL SWELLING: 0
CHOKING: 0
COUGH: 0
EYE PAIN: 0
WHEEZING: 0
ABDOMINAL PAIN: 0

## 2021-11-12 NOTE — ED PROVIDER NOTES
negative. red and bold elements reviewed    PAST MEDICAL HISTORY         Diagnosis Date    Kidney stones        SURGICAL HISTORY     Patient  has a past surgical history that includes [place] tooth extraction and Cystoscopy (Right, 7/19/2020). CURRENT MEDICATIONS       Previous Medications    MULTIPLE VITAMINS-MINERALS (MULTIVITAMIN ADULT PO)    Take by mouth       ALLERGIES     Patient is has No Known Allergies. FAMILY HISTORY     Patient'sfamily history includes No Known Problems in her mother. SOCIAL HISTORY     Patient  reports that she has never smoked. She has never used smokeless tobacco. She reports current alcohol use. She reports that she does not use drugs. PHYSICAL EXAM     ED TRIAGE VITALS  BP: 128/72, Temp: 97.6 °F (36.4 °C), Pulse: 69, Resp: 16, SpO2: 97 %  Physical Exam  Vitals and nursing note reviewed. Constitutional:       General: She is not in acute distress. Appearance: She is well-developed. She is not ill-appearing. Comments: Moist membranes, normal airway   HENT:      Head: Normocephalic and atraumatic. Right Ear: Tympanic membrane and external ear normal.      Left Ear: Tympanic membrane and external ear normal.      Nose: Nose normal.      Mouth/Throat:      Pharynx: No oropharyngeal exudate. Comments: Oropharynx normal  Eyes:      General: No scleral icterus. Right eye: No discharge. Left eye: No discharge. Extraocular Movements:      Right eye: Normal extraocular motion. Left eye: Normal extraocular motion. Conjunctiva/sclera: Conjunctivae normal.      Pupils: Pupils are equal, round, and reactive to light. Comments: Conjunctiva clear, nonicteric   Neck:      Thyroid: No thyromegaly. Vascular: No JVD. Comments: No Meningismus  Cardiovascular:      Rate and Rhythm: Normal rate and regular rhythm. Pulses: Normal pulses. Heart sounds: Normal heart sounds, S1 normal and S2 normal. No murmur heard.   No friction rub. No gallop. Comments: No murmur  Pulmonary:      Effort: Pulmonary effort is normal. No tachypnea or respiratory distress. Breath sounds: Normal breath sounds. No stridor. No decreased breath sounds, wheezing, rhonchi or rales. Comments: No cough, lungs clear  Chest:      Chest wall: No tenderness. Abdominal:      General: Bowel sounds are normal. There is no distension. Palpations: Abdomen is soft. There is no mass. Tenderness: There is no abdominal tenderness. There is right CVA tenderness. There is no left CVA tenderness, guarding or rebound. Comments: Right flank tenderness no pulsation or bruit   Musculoskeletal:         General: No tenderness. Normal range of motion. Cervical back: Normal range of motion. No spinous process tenderness or muscular tenderness. Comments: Extremities normal   Lymphadenopathy:      Cervical: No cervical adenopathy. Right cervical: No superficial cervical adenopathy. Left cervical: No superficial cervical adenopathy. Skin:     General: Skin is warm and dry. Findings: No erythema or rash. Comments: No rash or bruising   Neurological:      Mental Status: She is alert and oriented to person, place, and time. Cranial Nerves: No cranial nerve deficit. Motor: No abnormal muscle tone. Coordination: Coordination normal.      Deep Tendon Reflexes: Reflexes are normal and symmetric. Reflexes normal.      Comments: Appropriate, no focal finding   Psychiatric:         Behavior: Behavior normal.         Thought Content:  Thought content normal.         Judgment: Judgment normal.         DIAGNOSTIC RESULTS   Labs:   Results for orders placed or performed during the hospital encounter of 11/12/21   Urinalysis   Result Value Ref Range    Glucose, Ur Negative NEGATIVE mg/dl    Bilirubin Urine Negative NEGATIVE    Ketones, Urine Negative NEGATIVE    Specific Gravity, UA 1.010 1.002 - 1.030    Blood, Urine Small

## 2021-11-12 NOTE — ED PROVIDER NOTES
[facility] ENCOUNTER          Pt Name: Christos Talbert  MRN: [id number]  [place] 1955  Date of evaluation: 11/12/2021  Treating Resident Physician: Cuca Castillo MD  Supervising Physician: Ashley Deal MD    CHIEF COMPLAINT       Chief Complaint   Patient presents with    Urinary Frequency     History obtained from the patient. HISTORY OF PRESENT ILLNESS    HPI  Christos Talbert is a 61 y.o. female with PMHx of nephrolithiasis who presents to the emergency department for evaluation of urinary urgency. Patient reports right-sided flank tenderness as well as urinary urgency for the past 24 hours. She states that she has to urinate every 30 minutes. She denies any dysuria, fevers, chills, abdominal pain, diarrhea. The patient has no other acute complaints at this time. REVIEW OF SYSTEMS   Review of Systems   Constitutional: Negative for chills, diaphoresis, fatigue and fever. HENT: Negative for sore throat and trouble swallowing. Eyes: Negative for visual disturbance. Respiratory: Negative for cough and shortness of breath. Cardiovascular: Negative for chest pain, palpitations and leg swelling. Gastrointestinal: Negative for abdominal pain, blood in stool, diarrhea, nausea and vomiting. Genitourinary: Positive for flank pain and urgency. Negative for dysuria and hematuria. Musculoskeletal: Negative for arthralgias and myalgias. Skin: Negative for rash. Neurological: Negative for seizures, syncope, numbness and headaches.          PAST MEDICAL AND SURGICAL HISTORY     Past Medical History:   Diagnosis Date    Kidney stones      Past Surgical History:   Procedure Laterality Date    CYSTOSCOPY Right 7/19/2020    CYSTOSCOPY, RIGHT URETEROSCOPY, BASKET RETRIEVAL OF STONE, AND RIGHT URETERAL STENT INSERTION performed by Alonso Khoury MD at [address] [place] [address]   No current Mouth: Mucous membranes are moist.      Pharynx: Oropharynx is clear. No oropharyngeal exudate. Eyes:      General: No scleral icterus. Extraocular Movements: Extraocular movements intact. Conjunctiva/sclera: Conjunctivae normal.      Pupils: Pupils are equal, round, and reactive to light. Cardiovascular:      Rate and Rhythm: Normal rate and regular rhythm. Pulses: Normal pulses. Heart sounds: Normal heart sounds. No murmur heard. No friction rub. No gallop. Pulmonary:      Effort: Pulmonary effort is normal. No respiratory distress. Breath sounds: Normal breath sounds. Abdominal:      Palpations: Abdomen is soft. Tenderness: There is no abdominal tenderness. There is right CVA tenderness. There is no left CVA tenderness, guarding or rebound. Musculoskeletal:         General: No swelling or tenderness. Normal range of motion. Cervical back: Normal range of motion and neck supple. Right lower leg: No edema. Left lower leg: No edema. Skin:     General: Skin is warm and dry. Capillary Refill: Capillary refill takes less than 2 seconds. Neurological:      General: No focal deficit present. Mental Status: She is alert and oriented to person, place, and time. Cranial Nerves: No cranial nerve deficit. Motor: No weakness. MEDICAL DECISION MAKING   Initial Assessment:   53-year-old female presenting with right CVA tenderness and urinary urgency. She has a significant history of nephrolithiasis. .      Differential diagnosis includes but is not limited to:  Nephrolithiasis, muscle strain, rib fracture, cystitis, interstitial cystitis, UTI, sepsis, appendicitis    Although some of these diagnoses are unlikely they were considered in my medical decision making. MDM:   Patient had right-sided CVA tenderness on physical exam. UA positive for blood but not indicative of infection.  Noncon CT obtained of abdomen and pelvis revealed bilateral renal calculi that were nonobstructive, the largest being 3 mm. Instructed to remain well-hydrated. Discharged with naproxen and Norco for pain. ED RESULTS   Laboratory results:  Labs Reviewed   URINALYSIS - Abnormal; Notable for the following components:       Result Value    Blood, Urine Small (*)     All other components within normal limits   CBC WITH AUTO DIFFERENTIAL - Abnormal; Notable for the following components:    MCHC 31.6 (*)     RDW-SD 47.0 (*)     MPV 9.1 (*)     All other components within normal limits   BASIC METABOLIC PANEL   HEPATIC FUNCTION PANEL   LIPASE   ANION GAP   GLOMERULAR FILTRATION RATE, ESTIMATED   OSMOLALITY       Radiologic studies results:  CT ABDOMEN PELVIS WO CONTRAST Additional Contrast? None   Final Result   1. Nonobstructing bilateral nephrolithiasis. No obstructive uropathy visualized. The urinary bladder is under distended and not well assessed. Correlate with urinalysis. 2. Normal appendix. Chronic findings are discussed. **This report has been created using voice recognition software. It may contain minor errors which are inherent in voice recognition technology. **      Final report electronically signed by Dr Betty Hicks on 11/12/2021 2:28 PM          ED Medications administered this visit: Medications - No data to display      ED COURSE     ED Course as of 11/12/21 1440   Fri Nov 12, 2021   1431 CT ABDOMEN PELVIS WO CONTRAST Additional Contrast? None  Nonobstructing bilateral nephrolithiasis [TM]      ED Course User Index  [TM] Mattie Murillo MD             Strict return precautions and follow up instructions were discussed with the patient prior to discharge, with which the patient agrees.       MEDICATION CHANGES     New Prescriptions    NAPROXEN (NAPROSYN) 250 MG TABLET    Take 1 tablet by mouth 2 times daily (with meals) for 10 days    OXYCODONE-ACETAMINOPHEN (PERCOCET) 5-325 MG PER TABLET    Take 1 tablet by mouth every 6 hours as needed for Pain for up to 5 days. Intended supply: 5 days. Take lowest dose possible to manage pain    SENNOSIDES-DOCUSATE SODIUM (SENOKOT-S) 8.6-50 MG TABLET    Take 1 tablet by mouth daily         FINAL DISPOSITION     Final diagnoses:   Acute right flank pain   Hematuria, unspecified type   Ureteral stone     Condition: condition: stable  Dispo: Discharge to home      This transcription was electronically signed. Parts of this transcriptions may have been dictated by use of voice recognition software and electronically transcribed, and parts may have been transcribed with the assistance of an ED scribe. The transcription may contain errors not detected in proofreading. Please refer to my supervising physician's documentation if my documentation differs.     Electronically Signed: Patito Peralta MD, 11/12/21, 2:40 PM         Ty Salamanca MD  Resident  11/12/21 [id number]

## 2021-11-15 ENCOUNTER — TELEPHONE (OUTPATIENT)
Dept: FAMILY MEDICINE CLINIC | Age: 66
End: 2021-11-15

## 2021-11-15 NOTE — LETTER
[address], [address]  Phone: [phone]  Fax: [phone]    November 15, 2021    [address] [address]    Dear Claudette Guzman,    Thank you for choosing our [facility] on 11/12/21. Dr. Lottie Guzman wanted to make sure that you understand your discharge instructions and that you were able to fill any prescriptions that may have been ordered for you. Please contact the office at the above phone number if you were advised to follow up with Dr. Lottie Guzman, or if you have any further questions or needs. Also, did you know -                             [facility] ([facility]) practices can often offer you an appointment on the same day that you call for acute issues. *We have some [facility] offices that offer Walk-in appointments; check our website for availability in your community, [email or web address].      *Evisits are now available for patients through [address] [facility] ([facility]) also offers video visits through [address] If you do not have MyChart and are interested, please contact the office and a staff member may assist you or go to www."[email or web address]     Sincerely,   Lottie Guzman MD and your [facility]

## 2021-11-16 ENCOUNTER — TELEPHONE (OUTPATIENT)
Dept: FAMILY MEDICINE CLINIC | Age: 66
End: 2021-11-16

## 2021-11-16 NOTE — TELEPHONE ENCOUNTER
[facility] ([facility]) ED Follow up Call    Reason for ED visit:  Kidney stones     Did you receive discharge instructions? Yes  Do you understand the discharge instructions? Yes  Did the ED give you any new prescriptions? Yes  Were you able to fill your prescriptions? Yes    Do you have one of our red, yellow and green  Zone sheets that help you to determine when you should go to the ED? Not Applicable    Do you need or want to make a follow up appt with your PCP? Pt would like to either f/up with urology or this office for f/up to discuss     Do you have any further needs in the home i.e. Equipment? No    Called [facility] and left a detailed message on the nurse VM asking if they can call the office back to see if they can put the pt on their \"stone schedule\". Need an afternoon appt any day.

## 2021-11-16 NOTE — TELEPHONE ENCOUNTER
----- Message from Sybilzohra Thurstonlesa sent at 11/15/2021 11:33 AM EST -----  Subject: Appointment Request    Reason for Call: Routine ED Follow Up Visit    QUESTIONS  Type of Appointment? Established Patient  Reason for appointment request? No appointments available during search  Additional Information for Provider? Pt of Dr. Jagjit Umana pt was at [facility] on 11/12 kidney stones needs a follow up appt within a week no appts   available please call pt @ [phone]  ---------------------------------------------------------------------------  --------------  [address]  What is the best way for the office to contact you? OK to leave message on   voicemail  Preferred Call Back Phone Number? [phone]  ---------------------------------------------------------------------------  --------------  SCRIPT ANSWERS  Relationship to Patient? Self  (Patient requests to see provider urgently. )? No  Do you have any questions for your primary care provider that need to be   answered prior to your appointment? No  Have you been diagnosed with, awaiting test results for, or told that you   are suspected of having COVID-19 (Coronavirus)? (If patient has tested   negative or was tested as a requirement for work, school, or travel and   not based on symptoms, answer no)? No  Within the past two weeks have you developed any of the following symptoms   (answer no if symptoms have been present longer than 2 weeks or began   more than 2 weeks ago)? Fever or Chills, Cough, Shortness of breath or   difficulty breathing, Loss of taste or smell, Sore throat, Nasal   congestion, Sneezing or runny nose, Fatigue or generalized body aches   (answer no if pain is specific to a body part e.g. back pain), Diarrhea,   Headache?  Yes

## 2021-11-24 ENCOUNTER — OFFICE VISIT (OUTPATIENT)
Dept: UROLOGY | Age: 66
End: 2021-11-24
Payer: COMMERCIAL

## 2021-11-24 VITALS
HEIGHT: 66 IN | WEIGHT: 117 LBS | SYSTOLIC BLOOD PRESSURE: 114 MMHG | TEMPERATURE: 97.7 F | BODY MASS INDEX: 18.8 KG/M2 | DIASTOLIC BLOOD PRESSURE: 70 MMHG

## 2021-11-24 DIAGNOSIS — N20.0 KIDNEY STONES: Primary | ICD-10-CM

## 2021-11-24 LAB
BILIRUBIN URINE: NEGATIVE
BLOOD URINE, POC: NEGATIVE
CHARACTER, URINE: CLEAR
COLOR, URINE: YELLOW
GLUCOSE URINE: NEGATIVE MG/DL
KETONES, URINE: NEGATIVE
LEUKOCYTE CLUMPS, URINE: ABNORMAL
NITRITE, URINE: NEGATIVE
PH, URINE: 5.5 (ref 5–9)
PROTEIN, URINE: NEGATIVE MG/DL
SPECIFIC GRAVITY, URINE: 1.02 (ref 1–1.03)
UROBILINOGEN, URINE: 0.2 EU/DL (ref 0–1)

## 2021-11-24 PROCEDURE — G8484 FLU IMMUNIZE NO ADMIN: HCPCS | Performed by: NURSE PRACTITIONER

## 2021-11-24 PROCEDURE — G8420 CALC BMI NORM PARAMETERS: HCPCS | Performed by: NURSE PRACTITIONER

## 2021-11-24 PROCEDURE — 81003 URINALYSIS AUTO W/O SCOPE: CPT | Performed by: NURSE PRACTITIONER

## 2021-11-24 PROCEDURE — 1036F TOBACCO NON-USER: CPT | Performed by: NURSE PRACTITIONER

## 2021-11-24 PROCEDURE — 3017F COLORECTAL CA SCREEN DOC REV: CPT | Performed by: NURSE PRACTITIONER

## 2021-11-24 PROCEDURE — 99213 OFFICE O/P EST LOW 20 MIN: CPT | Performed by: NURSE PRACTITIONER

## 2021-11-24 PROCEDURE — G8427 DOCREV CUR MEDS BY ELIG CLIN: HCPCS | Performed by: NURSE PRACTITIONER

## 2021-11-24 NOTE — PROGRESS NOTES
[address] [facility].  [address]  [facility] [address]  Dept: [phone]  Loc: [phone]  Visit Date: 11/24/2021      HPI:     Donovan Courser is a 61 y.o. with past medical history as listed below who presents today in follow-up for ER follow up. Hx of kidney stones. Sees Dr Sun Contreras. Hx of negative work up for microscopic hematuria. Last week had right flank pain and urinary frequency. Went to urgent care and urine dip showed microscopic hematuria. Recommended ER  Evaluation. CT showed bilateral non obstructive stones. No hydronephrosis. She reports pain has resolved. Urinary symptoms are resolved. Concerned about having new stones since last CT as she was told by ER physician. La Olivares comes in today by herself. Hx is obtained from the patient and medical record. Current Outpatient Medications   Medication Sig Dispense Refill    Cholecalciferol (VITAMIN D3) 1.25 MG (11031 UT) TABS Take by mouth daily      Multiple Vitamins-Minerals (MULTIVITAMIN ADULT PO) Take by mouth       No current facility-administered medications for this visit. Past Medical History  La Olivares  has a past medical history of Kidney stones. Past Surgical History  The patient  has a past surgical history that includes [place] tooth extraction and Cystoscopy (Right, 7/19/2020). Family History  This patient's family history includes No Known Problems in her mother. Social History  La Olivares  reports that she has never smoked. She has never used smokeless tobacco. She reports current alcohol use. She reports that she does not use drugs. Subjective:     Review of Systems  No problems with ears, nose or throat. No problems with eyes. No chest pain, shortness of breath, abdominal pain, extremity pain or weakness, and no neurological deficits. No rashes.  symptoms per HPI. The remainder of the review of symptoms is negative.     Objective: PE:   Vitals:    11/24/21 1316   BP: 114/70   Temp: 97.7 °F (36.5 °C)   TempSrc: Temporal   Weight: 117 lb (53.1 kg)   Height: 5' 6\" (1.676 m)       Constitutional: Alert and oriented times 3, no acute distress and cooperative to examination with appropriate mood and affect. HENT:   Head:        Normocephalic and atraumatic. Mouth/Throat:         Mucous membranes are normal.   Eyes:         EOM are normal. No scleral icterus. PERRLA. Neck:        Supple, symmetrical, trachea midline  Cardiovascular:        Normal rate, regular rhythm, S1 S2 heart sounds. No murmurs, rub, or gallops. Pulmonary/Chest:  Normal respiratory rate and rhthym. No use of accessory muscles. Abdominal:         Soft. No tenderness. Bowel sounds present. Musculoskeletal:         Normal range of motion. No edema or tenderness of lower extremities. Extremities: No cyanosis, clubbing, or edema present. Neurological:        Alert and oriented. Psychiatric:        Normal mood and affect. Labs   Urine dip demonstrates   Results for POC orders placed in visit on 11/24/21   POCT Urinalysis No Micro (Auto)   Result Value Ref Range    Glucose, Ur Negative NEGATIVE mg/dl    Bilirubin Urine Negative     Ketones, Urine Negative NEGATIVE    Specific Gravity, Urine 1.025 1.002 - 1.030    Blood, UA POC Negative NEGATIVE    pH, Urine 5.50 5.0 - 9.0    Protein, Urine Negative NEGATIVE mg/dl    Urobilinogen, Urine 0.20 0.0 - 1.0 eu/dl    Nitrite, Urine Negative NEGATIVE    Leukocyte Clumps, Urine Trace (A) NEGATIVE    Color, Urine Yellow [id number]-STRAW    Character, Urine Clear [id number]        Recent BUN/Creatinine:  Lab Results   Component Value Date    BUN 13 11/12/2021    CREATININE 0.6 11/12/2021       Radiology  The patient has had a CT Stone Protocol which I have reviewed along with its accompanying report.   The study demonstrates :          FINDINGS:    LIMITATIONS: The evaluation of the solid organs is limited without IV contrast.       LUNG BASES: Atelectasis is noted at the lung bases. The heart size is normal.       LIVER/GALLBLADDER/BILIARY TREE: No radiopaque gallstones or biliary ductal dilatation is identified. The noncontrast liver is unremarkable.       PANCREAS: Unremarkable noncontrast CT appearance. SPLEEN: Unremarkable noncontrast CT appearance.       ADRENAL GLANDS: Unremarkable noncontrast CT appearance.       KIDNEYS/URETERS/BLADDER: A 3 mm nonobstructing calculus in the midpole of the left kidney is stable. Punctate nonobstructing calculi in the lower pole of the left kidney appear unchanged. Several 2 to 3 mm nonobstructing calculi are visualized in the    midpole of the right kidney. No hydronephrosis or hydroureter is observed. The urinary bladder is partially distended and not well visualized. Vascular calcifications are seen within the pelvis.       BOWEL: Moderate retained fecal material is seen throughout the colon. No bowel obstruction, free fluid, fluid collection, or free air is observed. The appendix is normal.       RETROPERITONEUM/LYMPH NODES:  The aorta is not dilated. No lymphadenopathy is observed.       PELVIS:  The uterus is atrophic. No adnexal lesions are identified.       MUSCULOSKELETAL: Diffuse osteopenia is present. Multilevel degenerative disc disease is noted in the thoracic and lumbar spine. The visualized skeletal structures appear intact.               Impression   1. Nonobstructing bilateral nephrolithiasis. No obstructive uropathy visualized. The urinary bladder is under distended and not well assessed. Correlate with urinalysis.       2. Normal appendix. Chronic findings are discussed.             Assessment & Plan:     Roderick Soto was seen today for nephrolithiasis. Diagnoses and all orders for this visit:    Kidney stones  -     POCT Urinalysis No Micro (Auto)      CT reviewed with patient. Don't note any new or larger stones. Reassurance given to patient.    She will continue to drink plenty of fluids and watch her sodium intake. KUB and fu in 6 months with Dr Gerard Pitt.       Janae Knight APRN - CNP, APRN  Urology

## 2022-02-16 ENCOUNTER — HOSPITAL ENCOUNTER (OUTPATIENT)
Dept: WOMENS IMAGING | Age: 67
Discharge: HOME | End: 2022-02-16
Payer: COMMERCIAL

## 2022-02-16 DIAGNOSIS — Z12.31 VISIT FOR SCREENING MAMMOGRAM: ICD-10-CM

## 2022-02-16 PROCEDURE — 77063 BREAST TOMOSYNTHESIS BI: CPT

## 2022-05-23 ENCOUNTER — HOSPITAL ENCOUNTER (OUTPATIENT)
Dept: GENERAL RADIOLOGY | Age: 67
Discharge: HOME | End: 2022-05-23
Payer: COMMERCIAL

## 2022-05-23 ENCOUNTER — HOSPITAL ENCOUNTER (OUTPATIENT)
Age: 67
Discharge: HOME | End: 2022-05-23
Payer: COMMERCIAL

## 2022-05-23 DIAGNOSIS — N20.0 KIDNEY STONES: ICD-10-CM

## 2022-05-23 PROCEDURE — 74018 RADEX ABDOMEN 1 VIEW: CPT

## 2022-06-02 ENCOUNTER — OFFICE VISIT (OUTPATIENT)
Dept: UROLOGY | Age: 67
End: 2022-06-02
Payer: MEDICARE

## 2022-06-02 VITALS
DIASTOLIC BLOOD PRESSURE: 82 MMHG | BODY MASS INDEX: 18.8 KG/M2 | WEIGHT: 117 LBS | HEIGHT: 66 IN | SYSTOLIC BLOOD PRESSURE: 128 MMHG

## 2022-06-02 DIAGNOSIS — N28.1 RENAL CYST: ICD-10-CM

## 2022-06-02 DIAGNOSIS — N20.0 KIDNEY STONES: Primary | ICD-10-CM

## 2022-06-02 DIAGNOSIS — R31.21 ASYMPTOMATIC MICROSCOPIC HEMATURIA: ICD-10-CM

## 2022-06-02 PROCEDURE — G8427 DOCREV CUR MEDS BY ELIG CLIN: HCPCS | Performed by: UROLOGY

## 2022-06-02 PROCEDURE — G8400 PT W/DXA NO RESULTS DOC: HCPCS | Performed by: UROLOGY

## 2022-06-02 PROCEDURE — 1036F TOBACCO NON-USER: CPT | Performed by: UROLOGY

## 2022-06-02 PROCEDURE — G8420 CALC BMI NORM PARAMETERS: HCPCS | Performed by: UROLOGY

## 2022-06-02 PROCEDURE — 3017F COLORECTAL CA SCREEN DOC REV: CPT | Performed by: UROLOGY

## 2022-06-02 PROCEDURE — 1090F PRES/ABSN URINE INCON ASSESS: CPT | Performed by: UROLOGY

## 2022-06-02 PROCEDURE — 1123F ACP DISCUSS/DSCN MKR DOCD: CPT | Performed by: UROLOGY

## 2022-06-02 PROCEDURE — 99214 OFFICE O/P EST MOD 30 MIN: CPT | Performed by: UROLOGY

## 2022-06-02 NOTE — PROGRESS NOTES
[address] [facility] [address]  Dept: [phone]  Loc: [phone]  Visit Date: 6/2/2022      HPI:     Victoriano Min is a 74 y.o. with past medical history as listed below who presents today in follow-up for ER follow up. Hx of kidney stones. Sees Dr Lara Hendrickson. Hx of negative work up for microscopic hematuria. Concerned about having new stones since last CT as she was told by ER physician. 2 weeks ago had nausea, fevers and left flank pain  Later that day felt better  No other recurrences    CT 11/2021  KIDNEYS/URETERS/BLADDER: A 3 mm nonobstructing calculus in the midpole of the left kidney is stable. Punctate nonobstructing calculi in the lower pole of the left kidney appear unchanged. Several 2 to 3 mm nonobstructing calculi are visualized in the    midpole of the right kidney. No hydronephrosis or hydroureter is observed. I independently reviewed and verified the images and reports from:    XR ABDOMEN (KUB) (SINGLE AP VIEW)    Result Date: 5/23/2022  PROCEDURE: XR ABDOMEN (KUB) (SINGLE AP VIEW) CLINICAL INFORMATION: Kidney stones. COMPARISON: CT scan of the abdomen dated 12 November 2021. KUB dated 15th of June 2021. . TECHNIQUE: A supine AP view of the abdomen was obtained. FINDINGS: There are no definite stones overlying the kidneys, ureters or bladder. There is a moderate amount of stool throughout the colon. There is mild dilatation of the small bowel loop in the left midabdomen. There are no distended bowel loops. There are no displaced bowel loops. There is no gross free air. No suspicious densities are present. No suspicious osseous lesions are present. 1. Punctate bilateral renal stones seen on CT scan of the abdomen dated 12 November 2021 are not clearly seen on the KUB. 2. Moderate amount of stool throughout the colon. Mild dilatation of a small bowel loop  in the left midabdomen.  **This report has been created using voice recognition software. It may contain minor errors which are inherent in voice recognition technology. ** Final report electronically signed by DR Javi Peacock on 5/23/2022 1:45 PM      Ladan Orellana comes in today by herself. Hx is obtained from the patient and medical record. Current Outpatient Medications   Medication Sig Dispense Refill    Cholecalciferol (VITAMIN D3) 1.25 MG (26784 UT) TABS Take by mouth daily      Multiple Vitamins-Minerals (MULTIVITAMIN ADULT PO) Take by mouth       No current facility-administered medications for this visit. Past Medical History  Ladan Orellana  has a past medical history of Kidney stones. Past Surgical History  The patient  has a past surgical history that includes [place] tooth extraction; Cystoscopy (Right, 7/19/2020); and Breast enhancement surgery (Bilateral, 2010). Family History  This patient's family history includes Breast Cancer (age of onset: 80) in her mother. Social History  Ladan Orellana  reports that she has never smoked. She has never used smokeless tobacco. She reports current alcohol use. She reports that she does not use drugs. Subjective:     Review of Systems  No problems with ears, nose or throat. No problems with eyes. No chest pain, shortness of breath, abdominal pain, extremity pain or weakness, and no neurological deficits. No rashes.  symptoms per HPI. The remainder of the review of symptoms is negative. Objective:     PE:   Vitals:    06/02/22 1345   BP: 128/82   Weight: 117 lb (53.1 kg)   Height: 5' 6\" (1.676 m)       Constitutional: Alert and oriented times 3, no acute distress and cooperative to examination with appropriate mood and affect. HENT:   Head:        Normocephalic and atraumatic. Mouth/Throat:         Mucous membranes are normal.   Eyes:         EOM are normal. No scleral icterus. PERRLA.    Neck:        Supple, symmetrical, trachea midline  Cardiovascular:        Normal rate, regular rhythm, S1 S2 heart sounds. No murmurs, rub, or gallops. Pulmonary/Chest:  Normal respiratory rate and rhthym. No use of accessory muscles. Abdominal:         Soft. No tenderness. Bowel sounds present. Musculoskeletal:         Normal range of motion. No edema or tenderness of lower extremities. Extremities: No cyanosis, clubbing, or edema present. Neurological:        Alert and oriented. Psychiatric:        Normal mood and affect. Labs   Urine dip demonstrates   No results found for this visit on 06/02/22. Recent BUN/Creatinine:  Lab Results   Component Value Date    BUN 13 11/12/2021    CREATININE 0.6 11/12/2021       Radiology  The patient has had a CT Stone Protocol which I have reviewed along with its accompanying report. The study demonstrates :          FINDINGS:    LIMITATIONS: The evaluation of the solid organs is limited without IV contrast.       LUNG BASES: Atelectasis is noted at the lung bases. The heart size is normal.       LIVER/GALLBLADDER/BILIARY TREE: No radiopaque gallstones or biliary ductal dilatation is identified. The noncontrast liver is unremarkable.       PANCREAS: Unremarkable noncontrast CT appearance. SPLEEN: Unremarkable noncontrast CT appearance.       ADRENAL GLANDS: Unremarkable noncontrast CT appearance.       KIDNEYS/URETERS/BLADDER: A 3 mm nonobstructing calculus in the midpole of the left kidney is stable. Punctate nonobstructing calculi in the lower pole of the left kidney appear unchanged. Several 2 to 3 mm nonobstructing calculi are visualized in the    midpole of the right kidney. No hydronephrosis or hydroureter is observed. The urinary bladder is partially distended and not well visualized. Vascular calcifications are seen within the pelvis.       BOWEL: Moderate retained fecal material is seen throughout the colon. No bowel obstruction, free fluid, fluid collection, or free air is observed.  The appendix is normal.     RETROPERITONEUM/LYMPH NODES:  The aorta is not dilated. No lymphadenopathy is observed.       PELVIS:  The uterus is atrophic. No adnexal lesions are identified.       MUSCULOSKELETAL: Diffuse osteopenia is present. Multilevel degenerative disc disease is noted in the thoracic and lumbar spine. The visualized skeletal structures appear intact.               Impression   1. Nonobstructing bilateral nephrolithiasis. No obstructive uropathy visualized. The urinary bladder is under distended and not well assessed. Correlate with urinalysis.       2. Normal appendix. Chronic findings are discussed.             Assessment & Plan:     Marilee Rose was seen today for follow-up. Diagnoses and all orders for this visit:    Kidney stones  -     US RENAL LIMITED; Future  -     XR ABDOMEN (KUB) (SINGLE AP VIEW); Future    Asymptomatic microscopic hematuria    Renal cyst  -     US RENAL LIMITED; Future      CT reviewed with patient.  2 small stones, 3 on the right 11/2021  KUB negative  Discussed continued observation or proactive ureteroscopy HLL/stent    She would like to observe conservatively  Discussed referral Nephrologist for medical management for stones    Rd Jacobsen M.D, MD,   Urology

## 2022-06-03 ENCOUNTER — TELEPHONE (OUTPATIENT)
Dept: FAMILY MEDICINE CLINIC | Age: 67
End: 2022-06-03

## 2022-06-03 DIAGNOSIS — H66.004 RECURRENT ACUTE SUPPURATIVE OTITIS MEDIA OF RIGHT EAR WITHOUT SPONTANEOUS RUPTURE OF TYMPANIC MEMBRANE: Primary | ICD-10-CM

## 2022-06-03 NOTE — TELEPHONE ENCOUNTER
Patient requesting referral d/t her h/o recurrent infections in her right ear. [id number] Adry Miguel for referral to [facility]?

## 2022-06-03 NOTE — TELEPHONE ENCOUNTER
----- Message from Marline Flowers sent at 6/3/2022  1:20 PM EDT -----  Subject: Referral Request    QUESTIONS   Reason for referral request? ENT   Has the physician seen you for this condition before? No   Preferred Specialist (if applicable)? Do you already have an appointment scheduled? Additional Information for Provider? Pt requesting renewal of referral for   [facility], went back in 2019.  ---------------------------------------------------------------------------  --------------  CALL BACK INFO  What is the best way for the office to contact you? OK to leave message on   voicemail  Preferred Call Back Phone Number? [phone]  ---------------------------------------------------------------------------  --------------  SCRIPT ANSWERS  Relationship to Patient?  Self

## 2022-06-23 ENCOUNTER — OFFICE VISIT (OUTPATIENT)
Dept: ENT CLINIC | Age: 67
End: 2022-06-23
Payer: MEDICARE

## 2022-06-23 VITALS
WEIGHT: 115.2 LBS | HEIGHT: 66 IN | OXYGEN SATURATION: 95 % | HEART RATE: 71 BPM | RESPIRATION RATE: 12 BRPM | DIASTOLIC BLOOD PRESSURE: 74 MMHG | TEMPERATURE: 97.3 F | BODY MASS INDEX: 18.51 KG/M2 | SYSTOLIC BLOOD PRESSURE: 122 MMHG

## 2022-06-23 DIAGNOSIS — L29.9 EAR ITCHING: ICD-10-CM

## 2022-06-23 DIAGNOSIS — J34.3 NASAL TURBINATE HYPERTROPHY: ICD-10-CM

## 2022-06-23 DIAGNOSIS — H61.21 IMPACTED CERUMEN OF RIGHT EAR: ICD-10-CM

## 2022-06-23 DIAGNOSIS — R09.81 NASAL CONGESTION: Primary | ICD-10-CM

## 2022-06-23 DIAGNOSIS — R51.9 RECURRENT HEADACHE: ICD-10-CM

## 2022-06-23 PROCEDURE — 99203 OFFICE O/P NEW LOW 30 MIN: CPT | Performed by: PHYSICIAN ASSISTANT

## 2022-06-23 PROCEDURE — 1090F PRES/ABSN URINE INCON ASSESS: CPT | Performed by: PHYSICIAN ASSISTANT

## 2022-06-23 PROCEDURE — 1036F TOBACCO NON-USER: CPT | Performed by: PHYSICIAN ASSISTANT

## 2022-06-23 PROCEDURE — 1123F ACP DISCUSS/DSCN MKR DOCD: CPT | Performed by: PHYSICIAN ASSISTANT

## 2022-06-23 PROCEDURE — G8400 PT W/DXA NO RESULTS DOC: HCPCS | Performed by: PHYSICIAN ASSISTANT

## 2022-06-23 PROCEDURE — G8420 CALC BMI NORM PARAMETERS: HCPCS | Performed by: PHYSICIAN ASSISTANT

## 2022-06-23 PROCEDURE — 3017F COLORECTAL CA SCREEN DOC REV: CPT | Performed by: PHYSICIAN ASSISTANT

## 2022-06-23 PROCEDURE — G8427 DOCREV CUR MEDS BY ELIG CLIN: HCPCS | Performed by: PHYSICIAN ASSISTANT

## 2022-06-23 RX ORDER — CETIRIZINE HYDROCHLORIDE 10 MG/1
10 TABLET ORAL DAILY
Qty: 30 TABLET | Refills: 0 | Status: SHIPPED | OUTPATIENT
Start: 2022-06-23 | End: 2022-07-23

## 2022-06-23 RX ORDER — FLUTICASONE PROPIONATE 50 MCG
2 SPRAY, SUSPENSION (ML) NASAL DAILY
Qty: 16 G | Refills: 2 | Status: SHIPPED | OUTPATIENT
Start: 2022-06-23 | End: 2022-08-15

## 2022-06-23 NOTE — PATIENT INSTRUCTIONS
Mix equal parts of rubbing alcohol and white distilled vinegar. I would recommend trying this when your ear itches. Kimo Owens on your side and put a few drops in the ear and let them soak for 5-10 minutes.

## 2022-06-23 NOTE — PROGRESS NOTES
[facility] [place] [facility]  [facility] EAR, NOSE AND THROAT  [facility]  Dept: [phone]  Dept Fax: [phone]  Loc: Shayna Ann is a 74 y.o. female who was referred by Milagro Eli MD for:  Chief Complaint   Patient presents with    Otitis Media     New patient here for evaluation of her otitis media, right ear. Referred by Mary May MD   .    HPI:     Patient presents for evaluation of ear and nasal complaints. Patient reports that her right nostril is always congested and running. She states this drainage is almost exclusively from the right nare. She also experiences some right-sided epiphora and as well as right-sided headaches. Patient denies a previous history of environmental allergies, but is recently began to wonder if this is causing her symptoms. She has not tried any kind of allergy regimen for her symptoms. She also reports some right-sided ear symptoms. She states that her right ear feels stuffy and congested. She feels that this congested sensation radiates into her neck. This has been intermittently ongoing for years. She denies any otorrhea. She states that she is bothered by getting water in her ears because she has hard time getting the water out. She denies a history of recurrent ear infections and tubes as a child. She does report some pruritus of the ears. She does have a history of a septoplasty approximately 40 years ago. Subjective:      REVIEW OF SYSTEMS:    A complete multi-organ review of systems was performed using a new patient questionnaire, and reviewed by me.   ENT:  negative except as noted in HPI  CONSTITUTIONAL:  negative except as noted in HPI  EYES:  negative except as noted in HPI  RESPIRATORY:  negative except as noted in HPI  CARDIOVASCULAR:  negative except as noted in HPI  GASTROINTESTINAL:  negative except as noted in HPI  GENITOURINARY:  negative except as noted in HPI  MUSCULOSKELETAL:  negative except as noted in HPI  SKIN:  negative except as noted in HPI  ENDOCRINE/METABOLIC: negative except as noted in HPI  HEMATOLOGIC/LYMPHATIC:  negative except as noted in HPI  ALLERGY/IMMUN: negative except as noted in HPI  NEUROLOGICAL:  negative except as noted in HPI  BEHAVIOR/PSYCH:  negative except as noted in HPI    Past Medical History:  Past Medical History:   Diagnosis Date    Kidney stones        Social History:    TOBACCO:   reports that she has never smoked. She has never used smokeless tobacco.  ETOH:   reports current alcohol use. DRUGS:   reports no history of drug use. Family History:       Problem Relation Age of Onset    Breast Cancer Mother 80       Surgical History:  Past Surgical History:   Procedure Laterality Date    BREAST ENHANCEMENT SURGERY Bilateral 2010    Saline implants    CYSTOSCOPY Right 7/19/2020    CYSTOSCOPY, RIGHT URETEROSCOPY, BASKET RETRIEVAL OF STONE, AND RIGHT URETERAL STENT INSERTION performed by Larry Barrios MD at [address] EXTRACTION          Objective: This is a 74 y.o. female. Patient is alert and oriented to person, place and time. Patient appears well developed, well nourished. Mood is happy with normal affect. Not obviously hearing impaired. No abnormality in speech noted. /74 (Site: Right Upper Arm, Position: Sitting)   Pulse 71   Temp 97.3 °F (36.3 °C) (Infrared)   Resp 12   Ht 5' 6\" (1.676 m)   Wt 115 lb 3.2 oz (52.3 kg)   LMP 11/16/2017   SpO2 95%   BMI 18.59 kg/m²     Head:   Normocephalic, atraumatic. No obvious masses or lesions noted. Ears:  External ears: Normal: no scars, lesions or masses. Mastoid process: No erythema noted. No tenderness to palpation. R External auditory canal: Narrow canal with cerumen impaction deep in the EAC behind a bulge in the posterior canal. Impaction removed using suction under handheld magnification.   No purulent drainage, canal edema, canal erythema. No evidence of otitis externa  L External auditory canal: Narrow canal without excessive cerumen. Canal otherwise clear and free of any pathology   Tympanic membranes:  R intact, translucent                                                  L intact, translucent  Nose:    External nose: Appears midline. No obvious deformity or masses. Septum:   Relatively midline. No septal hematoma. No perforation. Mucosa:  clear  Turbinates:  Hypertrophic and congested, but especially the right middle turbinate that is abutting the septum on exam today. Left middle turbinate is hypertrophic as well, but not as severe as the right side            Discharge:  clear    Mouth/Throat:  Lips, tongue and oral cavity: Normal. No masses or lesions noted   Dentition: good, no malocclusion  Oral mucosa: moist  Tonsils: Atrophic  Oropharynx: normal-appearing mucosa  Hard and soft palates: symmetrical and intact. Salivary glands: not enlarged and no tenderness to palpation. Uvula: midline, no obvious lesions     Eyes: ELVIRA, EOM intact. Conjunctiva moist without discharge. Lungs: Normal effort of breathing, not obviously distressed. Neuro: Cranial nerves II-XII grossly intact. Extremities: No clubbing, edema, or cyanosis noted. Assessment/Plan:     Diagnosis Orders   1. Nasal congestion  CT FACIAL BONES WO CONTRAST   2. Nasal turbinate hypertrophy  CT FACIAL BONES WO CONTRAST   3. Recurrent headache  CT FACIAL BONES WO CONTRAST   4. Ear itching     5. Impacted cerumen of right ear         The patient is a 74 y.o. female that presents for evaluation of ear and nasal complaints. I recommended starting the patient on an allergy regimen of oral antihistamine and intranasal steroid to see if this will help some of her nasal/sinus symptoms. If symptoms persist, she will undergo a CT facial bones to further assess her nasal anatomy and sinuses.   Patient may also have some eustachian tube dysfunction second to her allergy/nasal symptoms. I recommended using a 50-50 mixture of rubbing alcohol and white distilled vinegar in her ears to help with the itchiness. Patient will follow-up after the CT scan to review results and if there is any further intervention needed. Based on her very large right middle turbinate I would not be surprised if she has a breanna bullosa that may be causing her congestion and potentially headaches as well. Patient expresses understanding of the plan and thanked me.   She will contact the office in the meantime with new/worsening symptoms or other concerns    (Please note that portions of this note may have been completed with a voice recognition program.  Efforts were made to edit the dictation but occasionally words are mis-transcribed.)    Electronically signed by DAVID Mcmahon on 6/28/2022 at 4:34 PM

## 2022-07-05 ENCOUNTER — TELEPHONE (OUTPATIENT)
Dept: ENT CLINIC | Age: 67
End: 2022-07-05

## 2022-07-05 NOTE — TELEPHONE ENCOUNTER
Peer to peer completed. CT approved until August 22, 2022. Approval number [id number] (If that number is incorrect, the S could be an F. It was difficult to understand the machine during phone call and the phone call ended before I could have repeat).

## 2022-07-05 NOTE — TELEPHONE ENCOUNTER
The denial states that my note does not describe any nasal issues she is having. I am unsure why the denial states that since my note very clearly describes anatomical issues that are causing obstruction in her nose as well as my first diagnoses for the office visit being nasal congestion. The patient has not been treated for at least 3 weeks at this point, which would make sense as to why it would be denied. However, I did not see this mentioned in the denial note. Do I need to do a peer to peer? If so, I would wait to do it until next week when the patient has reached at least 3 weeks of treatment that I described in my note. Do you have any recommendations on how to proceed here Priti? I can place a new order for the CT and we can resend my notes and request to the insurance if needed. She just needs a CT completed prior to her visit with Dr. Sandro Alejandro on 7/26/2022.   Is probably better to cancel her currently scheduled CT for somewhere around 7/21/22 or 7/22/22 or 7/25/2022

## 2022-07-05 NOTE — TELEPHONE ENCOUNTER
Ameena becker [facility] left message on clinical voicemail regarding patient. She said we could check online at [email or web address] or for a peer to peer we can call [phone] and reference ID# [id number]. She did not say if we need to set up an appointment time for the call or not.

## 2022-07-19 ENCOUNTER — HOSPITAL ENCOUNTER (OUTPATIENT)
Dept: CT IMAGING | Age: 67
Discharge: HOME | End: 2022-07-19
Payer: COMMERCIAL

## 2022-07-19 DIAGNOSIS — R09.81 NASAL CONGESTION: ICD-10-CM

## 2022-07-19 DIAGNOSIS — R51.9 RECURRENT HEADACHE: ICD-10-CM

## 2022-07-19 DIAGNOSIS — J34.3 NASAL TURBINATE HYPERTROPHY: ICD-10-CM

## 2022-07-19 PROCEDURE — 70486 CT MAXILLOFACIAL W/O DYE: CPT

## 2022-07-26 ENCOUNTER — OFFICE VISIT (OUTPATIENT)
Dept: ENT CLINIC | Age: 67
End: 2022-07-26
Payer: MEDICARE

## 2022-07-26 ENCOUNTER — PREP FOR PROCEDURE (OUTPATIENT)
Dept: ENT CLINIC | Age: 67
End: 2022-07-26

## 2022-07-26 VITALS
WEIGHT: 115.4 LBS | HEART RATE: 69 BPM | TEMPERATURE: 97.3 F | RESPIRATION RATE: 14 BRPM | BODY MASS INDEX: 18.54 KG/M2 | DIASTOLIC BLOOD PRESSURE: 72 MMHG | OXYGEN SATURATION: 97 % | HEIGHT: 66 IN | SYSTOLIC BLOOD PRESSURE: 122 MMHG

## 2022-07-26 DIAGNOSIS — H61.301 STENOSIS OF RIGHT EXTERNAL AUDITORY CANAL: Primary | ICD-10-CM

## 2022-07-26 DIAGNOSIS — Z01.818 PRE-OP TESTING: Primary | ICD-10-CM

## 2022-07-26 DIAGNOSIS — H61.301 STENOSIS OF RIGHT EXTERNAL AUDITORY CANAL: ICD-10-CM

## 2022-07-26 PROCEDURE — G8420 CALC BMI NORM PARAMETERS: HCPCS | Performed by: OTOLARYNGOLOGY

## 2022-07-26 PROCEDURE — 99203 OFFICE O/P NEW LOW 30 MIN: CPT | Performed by: OTOLARYNGOLOGY

## 2022-07-26 PROCEDURE — 1090F PRES/ABSN URINE INCON ASSESS: CPT | Performed by: OTOLARYNGOLOGY

## 2022-07-26 PROCEDURE — 1123F ACP DISCUSS/DSCN MKR DOCD: CPT | Performed by: OTOLARYNGOLOGY

## 2022-07-26 PROCEDURE — G8400 PT W/DXA NO RESULTS DOC: HCPCS | Performed by: OTOLARYNGOLOGY

## 2022-07-26 PROCEDURE — G8427 DOCREV CUR MEDS BY ELIG CLIN: HCPCS | Performed by: OTOLARYNGOLOGY

## 2022-07-26 PROCEDURE — 3017F COLORECTAL CA SCREEN DOC REV: CPT | Performed by: OTOLARYNGOLOGY

## 2022-07-26 PROCEDURE — 1036F TOBACCO NON-USER: CPT | Performed by: OTOLARYNGOLOGY

## 2022-07-26 ASSESSMENT — ENCOUNTER SYMPTOMS
SINUS PRESSURE: 0
VOMITING: 0
COLOR CHANGE: 0
FACIAL SWELLING: 0
TROUBLE SWALLOWING: 0
NAUSEA: 0
STRIDOR: 0
DIARRHEA: 0
RHINORRHEA: 0
CHEST TIGHTNESS: 0
SORE THROAT: 0
CHOKING: 0
APNEA: 0
VOICE CHANGE: 0
SHORTNESS OF BREATH: 0
COUGH: 0
ABDOMINAL PAIN: 0
WHEEZING: 0

## 2022-07-26 NOTE — H&P (VIEW-ONLY)
[facility] [place] [facility]  [facility] EAR, NOSE AND THROAT  [address] [place] [address] [place] [address]  Dept: [phone]  Dept Fax: [phone]  Loc: [phone]    Ita Cutler is a 74 y.o. female who was referred byNo ref. provider found for:  Chief Complaint   Patient presents with    Follow-up     Patient is here for f/u after CT facial bones     . HPI:     Ita Cutler is a 74 y.o. female who presents today for follow-up on the CT of her sinuses. This showed no sinusitis and a good airway. She felt better after the antibiotics. Apparently we have cleared her sinusitis. No observed obstructions are noted and no surgery is indicated. She still has a problem with a very tight right external auditory meatus which did show up on the CT scan, with a normal caliber to the more medial ear canal.  After discussion what was involved she requested we proceed with correcting that as an outpatient under either sedation or general.  With the obstruction laterally she keeps getting otitis externa. She first saw us for that in 2019. Opening measures about 2 mm    History:     No Known Allergies  Current Outpatient Medications   Medication Sig Dispense Refill    Cholecalciferol (VITAMIN D3) 1.25 MG (03899 UT) TABS Take by mouth daily      Multiple Vitamins-Minerals (MULTIVITAMIN ADULT PO) Take by mouth daily       No current facility-administered medications for this visit.      Past Medical History:   Diagnosis Date    Kidney stones       Past Surgical History:   Procedure Laterality Date    BREAST ENHANCEMENT SURGERY Bilateral 2010    Saline implants    CYSTOSCOPY Right 7/19/2020    CYSTOSCOPY, RIGHT URETEROSCOPY, BASKET RETRIEVAL OF STONE, AND RIGHT URETERAL STENT INSERTION performed by Mallika Mcadams MD at [address] [place] [address] Dr KAYLI       Family History   Problem Relation Age of Onset    Cancer Mother     Breast Cancer Mother 80    Diabetes Neg Hx     Heart Disease Neg Hx     High Blood Pressure Neg Hx     Stroke Neg Hx      Social History     Tobacco Use    Smoking status: Never    Smokeless tobacco: Never   Substance Use Topics    Alcohol use: Yes     Comment: rare       Subjective:      Review of Systems   Constitutional:  Negative for activity change, appetite change, chills, diaphoresis, fatigue, fever and unexpected weight change. HENT:  Negative for congestion, dental problem, ear discharge, ear pain, facial swelling, hearing loss, mouth sores, nosebleeds, postnasal drip, rhinorrhea, sinus pressure, sneezing, sore throat, tinnitus, trouble swallowing and voice change. Eyes:  Negative for visual disturbance. Respiratory:  Negative for apnea, cough, choking, chest tightness, shortness of breath, wheezing and stridor. Cardiovascular:  Negative for chest pain, palpitations and leg swelling. Gastrointestinal:  Negative for abdominal pain, diarrhea, nausea and vomiting. Endocrine: Negative for cold intolerance, heat intolerance, polydipsia and polyuria. Genitourinary:  Negative for dysuria, enuresis and hematuria. Musculoskeletal:  Negative for arthralgias, gait problem, neck pain and neck stiffness. Skin:  Negative for color change and rash. Allergic/Immunologic: Negative for environmental allergies, food allergies and immunocompromised state. Neurological:  Negative for dizziness, syncope, facial asymmetry, speech difficulty, light-headedness and headaches. Hematological:  Negative for adenopathy. Does not bruise/bleed easily. Psychiatric/Behavioral:  Negative for confusion and sleep disturbance. The patient is not nervous/anxious. Objective:   /72 (Site: Left Upper Arm, Position: Sitting)   Pulse 69   Temp 97.3 °F (36.3 °C) (Infrared)   Resp 14   Ht 5' 6\" (1.676 m)   Wt 115 lb 6.4 oz (52.3 kg)   LMP 11/16/2017   SpO2 97%   BMI 18.63 kg/m²     Physical Exam  Vitals and nursing note reviewed.    Constitutional: Appearance: She is well-developed. HENT:      Head: Normocephalic and atraumatic. No laceration. Salivary Glands: Right salivary gland is not diffusely enlarged or tender. Left salivary gland is not diffusely enlarged or tender. Comments:        Right Ear: Hearing, tympanic membrane and external ear normal. No drainage or swelling. No middle ear effusion. Tympanic membrane is not perforated or erythematous. Left Ear: Hearing, tympanic membrane and external ear normal. No drainage or swelling. No middle ear effusion. Tympanic membrane is not perforated or erythematous. Ears:      Comments: Stenosis right external auditory meatus. Nearly collapsed by growth of conchal cartilage. Medial canal appears to have normal caliber. Left ear canal is a little tight. Nose: Nose normal. No septal deviation, mucosal edema or rhinorrhea. Mouth/Throat:      Mouth: Mucous membranes are moist. Mucous membranes are not pale and not dry. No oral lesions. Tongue: No lesions. Pharynx: Oropharynx is clear. Uvula midline. No oropharyngeal exudate or posterior oropharyngeal erythema. Comments: LIps: lips normal     Mallampati 1  Base of tongue: symmetric  Mirror exam deferred due to gag reflex. Eyes:      Extraocular Movements: Extraocular movements intact. Comments: Conjugate gaze   Neck:      Thyroid: No thyroid mass or thyromegaly. Trachea: Phonation normal. No tracheal deviation. Comments:     Pulmonary:      Effort: Pulmonary effort is normal. No retractions. Breath sounds: No stridor. Musculoskeletal:      Cervical back: Normal range of motion and neck supple. Lymphadenopathy:      Cervical: No cervical adenopathy. Neurological:      Mental Status: She is alert and oriented to person, place, and time. Cranial Nerves: Cranial nerves are intact. Cranial nerve deficit: VIIth N function intact bilat.    Psychiatric:         Mood and Affect: Mood and affect normal.         Behavior: Behavior is cooperative. Data:  All of the past medical history, past surgical history, family history,social history, allergies and current medications were reviewed with the patient. 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 The findings were explained and her questions were answered. Explained removal of a crescent of the conchal bowl cartilage and then redraping the skin with excision of excess skin and primary closure, would likely correct her problem. No guarantees were made. This can be done under local with sedation or with a brief general anesthetic. Estimated duration of the procedure 30 minutes. No meds to be held       [employer]. Stacie Elkins MD    **This report has been created using voice recognition software. It may contain minor errors which are inherent in voicerecognition technology. **

## 2022-07-26 NOTE — PROGRESS NOTES
[facility] [place] [facility]  [facility] EAR, NOSE AND THROAT  [address] [place] [id number]  Dept: [phone]  Dept Fax: [phone]  Loc: [phone]    Lilli Johnson is a 74 y.o. female who was referred byNo ref. provider found for:  Chief Complaint   Patient presents with    Follow-up     Patient is here for f/u after CT facial bones     . HPI:     Lilli Johnson is a 74 y.o. female who presents today for follow-up on the CT of her sinuses. This showed no sinusitis and a good airway. She felt better after the antibiotics. Apparently we have cleared her sinusitis. No observed obstructions are noted and no surgery is indicated. She still has a problem with a very tight right external auditory meatus which did show up on the CT scan, with a normal caliber to the more medial ear canal.  After discussion what was involved she requested we proceed with correcting that as an outpatient under either sedation or general.  With the obstruction laterally she keeps getting otitis externa. She first saw us for that in 2019. Opening measures about 2 mm    History:     No Known Allergies  Current Outpatient Medications   Medication Sig Dispense Refill    Cholecalciferol (VITAMIN D3) 1.25 MG (37561 UT) TABS Take by mouth daily      Multiple Vitamins-Minerals (MULTIVITAMIN ADULT PO) Take by mouth daily       No current facility-administered medications for this visit.      Past Medical History:   Diagnosis Date    Kidney stones       Past Surgical History:   Procedure Laterality Date    BREAST ENHANCEMENT SURGERY Bilateral 2010    Saline implants    CYSTOSCOPY Right 7/19/2020    CYSTOSCOPY, RIGHT URETEROSCOPY, BASKET RETRIEVAL OF STONE, AND RIGHT URETERAL STENT INSERTION performed by Josue Tello MD at [address] EXTRACTION       Family History   Problem Relation Age of Onset    Cancer Mother     Breast Cancer Mother 80    Diabetes Neg Hx     Heart Disease Neg Hx     High Blood Pressure Neg Hx     Stroke Neg Hx      Social History     Tobacco Use    Smoking status: Never    Smokeless tobacco: Never   Substance Use Topics    Alcohol use: Yes     Comment: rare       Subjective:      Review of Systems   Constitutional:  Negative for activity change, appetite change, chills, diaphoresis, fatigue, fever and unexpected weight change. HENT:  Negative for congestion, dental problem, ear discharge, ear pain, facial swelling, hearing loss, mouth sores, nosebleeds, postnasal drip, rhinorrhea, sinus pressure, sneezing, sore throat, tinnitus, trouble swallowing and voice change. Eyes:  Negative for visual disturbance. Respiratory:  Negative for apnea, cough, choking, chest tightness, shortness of breath, wheezing and stridor. Cardiovascular:  Negative for chest pain, palpitations and leg swelling. Gastrointestinal:  Negative for abdominal pain, diarrhea, nausea and vomiting. Endocrine: Negative for cold intolerance, heat intolerance, polydipsia and polyuria. Genitourinary:  Negative for dysuria, enuresis and hematuria. Musculoskeletal:  Negative for arthralgias, gait problem, neck pain and neck stiffness. Skin:  Negative for color change and rash. Allergic/Immunologic: Negative for environmental allergies, food allergies and immunocompromised state. Neurological:  Negative for dizziness, syncope, facial asymmetry, speech difficulty, light-headedness and headaches. Hematological:  Negative for adenopathy. Does not bruise/bleed easily. Psychiatric/Behavioral:  Negative for confusion and sleep disturbance. The patient is not nervous/anxious. Objective:   /72 (Site: Left Upper Arm, Position: Sitting)   Pulse 69   Temp 97.3 °F (36.3 °C) (Infrared)   Resp 14   Ht 5' 6\" (1.676 m)   Wt 115 lb 6.4 oz (52.3 kg)   LMP 11/16/2017   SpO2 97%   BMI 18.63 kg/m²     Physical Exam  Vitals and nursing note reviewed.    Constitutional: Appearance: She is well-developed. HENT:      Head: Normocephalic and atraumatic. No laceration. Salivary Glands: Right salivary gland is not diffusely enlarged or tender. Left salivary gland is not diffusely enlarged or tender. Comments:        Right Ear: Hearing, tympanic membrane and external ear normal. No drainage or swelling. No middle ear effusion. Tympanic membrane is not perforated or erythematous. Left Ear: Hearing, tympanic membrane and external ear normal. No drainage or swelling. No middle ear effusion. Tympanic membrane is not perforated or erythematous. Ears:      Comments: Stenosis right external auditory meatus. Nearly collapsed by growth of conchal cartilage. Medial canal appears to have normal caliber. Left ear canal is a little tight. Nose: Nose normal. No septal deviation, mucosal edema or rhinorrhea. Mouth/Throat:      Mouth: Mucous membranes are moist. Mucous membranes are not pale and not dry. No oral lesions. Tongue: No lesions. Pharynx: Oropharynx is clear. Uvula midline. No oropharyngeal exudate or posterior oropharyngeal erythema. Comments: LIps: lips normal     Mallampati 1  Base of tongue: symmetric  Mirror exam deferred due to gag reflex. Eyes:      Extraocular Movements: Extraocular movements intact. Comments: Conjugate gaze   Neck:      Thyroid: No thyroid mass or thyromegaly. Trachea: Phonation normal. No tracheal deviation. Comments:     Pulmonary:      Effort: Pulmonary effort is normal. No retractions. Breath sounds: No stridor. Musculoskeletal:      Cervical back: Normal range of motion and neck supple. Lymphadenopathy:      Cervical: No cervical adenopathy. Neurological:      Mental Status: She is alert and oriented to person, place, and time. Cranial Nerves: Cranial nerves are intact. Cranial nerve deficit: VIIth N function intact bilat.    Psychiatric:         Mood and Affect: Mood and affect normal.         Behavior: Behavior is cooperative. Data:  All of the past medical history, past surgical history, family history,social history, allergies and current medications were reviewed with the patient. 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 The findings were explained and her questions were answered. Explained removal of a crescent of the conchal bowl cartilage and then redraping the skin with excision of excess skin and primary closure, would likely correct her problem. No guarantees were made. This can be done under local with sedation or with a brief general anesthetic. Estimated duration of the procedure 30 minutes. No meds to be held       [employer]. Giselle Bauer MD    **This report has been created using voice recognition software. It may contain minor errors which are inherent in voicerecognition technology. **

## 2022-07-28 ENCOUNTER — TELEPHONE (OUTPATIENT)
Dept: ENT CLINIC | Age: 67
End: 2022-07-28

## 2022-07-28 NOTE — TELEPHONE ENCOUNTER
I lvm for patient. Needed to r/s her p/o appt. Dr. Jim Bishop would like seen sooner due to having sutures that will need removed. I lvm for patient to return call. I have r/s to Fri 8/26 at 230 per [facility].

## 2022-07-29 LAB
ABSOLUTE BASO #: 0.03 K/UL (ref 0–0.2)
ABSOLUTE EOS #: 0.16 K/UL (ref 0–0.5)
ABSOLUTE LYMPH #: 1.71 K/UL (ref 1–4)
ABSOLUTE MONO #: 0.46 K/UL (ref 0.2–1)
ABSOLUTE NEUT #: 2.72 K/UL (ref 1.5–7.5)
BASOPHILS RELATIVE PERCENT: 0.6 %
EOSINOPHILS RELATIVE PERCENT: 3.1 %
HCT VFR BLD CALC: 39.3 % (ref 34–45)
HEMOGLOBIN: 13.1 G/DL (ref 11.5–15.5)
LYMPHOCYTE %: 33.6 %
MCH RBC QN AUTO: 30.5 PG (ref 25–33)
MCHC RBC AUTO-ENTMCNC: 33.3 G/DL (ref 31–36)
MCV RBC AUTO: 91.4 FL (ref 80–99)
MONOCYTES # BLD: 9 %
NEUTROPHILS RELATIVE PERCENT: 53.5 %
PDW BLD-RTO: 12.2 % (ref 11.5–15)
PLATELETS: 210 K/UL (ref 130–400)
PMV BLD AUTO: 9.7 FL (ref 9.3–13)
RBC: 4.3 M/UL (ref 3.8–5.4)
WBC: 5.1 K/UL (ref 3.5–11)

## 2022-08-15 ENCOUNTER — PREP FOR PROCEDURE (OUTPATIENT)
Dept: ENT CLINIC | Age: 67
End: 2022-08-15

## 2022-08-15 DIAGNOSIS — H61.301 STENOSIS OF RIGHT EXTERNAL AUDITORY CANAL: Primary | ICD-10-CM

## 2022-08-15 NOTE — PROGRESS NOTES
In preparation for their surgical procedure above patient was screened for Obstructive Sleep Apnea (PATRICIO) using the STOP-Bang Questionnaire by the Pre-Admission Testing department. This is a pre-surgical screening tool for patient safety and serves as a recommendation, this WILL NOT cause cancellation of surgery. STOP-Bang Questionnaire  * Do you currently see a pulmonologist?  No     If yes STOP, do not complete. Patient follows with Dr.     1.  Do you snore loudly (able to be heard in the next room)? No    2. Do you often feel tired or sleepy during the daytime? No       3. Has anyone ever told you that you stop breathing during your sleep? No    4. Do you have or are you being treated for high blood pressure? No      5. BMI more than 35? BMI (Calculated): 18.6        No    6. Age over 48 years? 74 y.o. Yes    7. Neck Circumference greater than 17 inches for male or 16 inches for female? Measured           (visits only)            Not Applicable    8. Gender Male? No      TOTAL SCORE: 1    PATRICIO - Low Risk : Yes to 0 - 2 questions  PATRICIO - Intermediate Risk : Yes to 3 - 4 questions  PATRICIO - High Risk : Yes to 5 - 8 questions    Adapted from:   STOP Questionnaire: A Tool to Screen Patients for Obstructive Sleep Apnea   DAVID Oconnell.P.C., CECE Mancilla.B.B.S., Michelle Morgan M.D., [facility]. Brittni Robledo, Ph.D., CECE Augustine.B.B.S., CECE Ventura.Sc., Jose Newberry M.D., [facility]. DAVID Ledesma.P.C.    Anesthesiology 2008; 433:779-00 Copyright 2008, the [address] [employer], jose luis 37.   ----------------------------------------------------------------------------------------------------------------

## 2022-08-15 NOTE — PROGRESS NOTES
Patient states that she didn't know she needed someone to be with her for 24 hours.  She said she would check with her son  NPO after midnight  [employer] and drivers license  Wear comfortable clean clothing  Do not bring jewelry  Shower night before and morning of surgery with a liquid antibacterial soap  Bring list of medications with dosage and how often taken  Follow all instructions given by your physician   needed at discharge  Please limit to 2 visitors for surgery  You must have a responsible adult with you day of surgery and for 24 hours after surgery  Please bring and wear mask  Call [phone] for any questions

## 2022-08-21 PROBLEM — H61.301: Status: ACTIVE | Noted: 2022-08-21

## 2022-08-21 RX ORDER — SODIUM CHLORIDE 0.9 % (FLUSH) 0.9 %
5-40 SYRINGE (ML) INJECTION PRN
Status: CANCELLED | OUTPATIENT
Start: 2022-08-21

## 2022-08-21 RX ORDER — SODIUM CHLORIDE 9 MG/ML
INJECTION, SOLUTION INTRAVENOUS PRN
Status: CANCELLED | OUTPATIENT
Start: 2022-08-21

## 2022-08-21 RX ORDER — SODIUM CHLORIDE 0.9 % (FLUSH) 0.9 %
5-40 SYRINGE (ML) INJECTION EVERY 12 HOURS SCHEDULED
Status: CANCELLED | OUTPATIENT
Start: 2022-08-21

## 2022-08-22 ENCOUNTER — ANESTHESIA EVENT (OUTPATIENT)
Dept: OPERATING ROOM | Age: 67
End: 2022-08-22
Payer: MEDICARE

## 2022-08-22 ENCOUNTER — ANESTHESIA (OUTPATIENT)
Dept: OPERATING ROOM | Age: 67
End: 2022-08-22
Payer: MEDICARE

## 2022-08-22 ENCOUNTER — HOSPITAL ENCOUNTER (OUTPATIENT)
Age: 67
Setting detail: OUTPATIENT SURGERY
Discharge: HOME | End: 2022-08-22
Attending: OTOLARYNGOLOGY | Admitting: OTOLARYNGOLOGY
Payer: MEDICARE

## 2022-08-22 VITALS
HEART RATE: 65 BPM | HEIGHT: 66 IN | DIASTOLIC BLOOD PRESSURE: 74 MMHG | TEMPERATURE: 96.2 F | OXYGEN SATURATION: 96 % | BODY MASS INDEX: 18.48 KG/M2 | RESPIRATION RATE: 16 BRPM | SYSTOLIC BLOOD PRESSURE: 112 MMHG | WEIGHT: 115 LBS

## 2022-08-22 DIAGNOSIS — G89.18 POSTOPERATIVE PAIN: Primary | ICD-10-CM

## 2022-08-22 PROCEDURE — 3600000012 HC SURGERY LEVEL 2 ADDTL 15MIN: Performed by: OTOLARYNGOLOGY

## 2022-08-22 PROCEDURE — 2500000003 HC RX 250 WO HCPCS: Performed by: NURSE ANESTHETIST, CERTIFIED REGISTERED

## 2022-08-22 PROCEDURE — 6360000002 HC RX W HCPCS: Performed by: NURSE ANESTHETIST, CERTIFIED REGISTERED

## 2022-08-22 PROCEDURE — 6370000000 HC RX 637 (ALT 250 FOR IP): Performed by: OTOLARYNGOLOGY

## 2022-08-22 PROCEDURE — 69310 REBUILD OUTER EAR CANAL: CPT | Performed by: OTOLARYNGOLOGY

## 2022-08-22 PROCEDURE — 3700000000 HC ANESTHESIA ATTENDED CARE: Performed by: OTOLARYNGOLOGY

## 2022-08-22 PROCEDURE — 2500000003 HC RX 250 WO HCPCS: Performed by: OTOLARYNGOLOGY

## 2022-08-22 PROCEDURE — 2709999900 HC NON-CHARGEABLE SUPPLY: Performed by: OTOLARYNGOLOGY

## 2022-08-22 PROCEDURE — 2580000003 HC RX 258: Performed by: OTOLARYNGOLOGY

## 2022-08-22 PROCEDURE — 3600000002 HC SURGERY LEVEL 2 BASE: Performed by: OTOLARYNGOLOGY

## 2022-08-22 PROCEDURE — 7100000010 HC PHASE II RECOVERY - FIRST 15 MIN: Performed by: OTOLARYNGOLOGY

## 2022-08-22 PROCEDURE — 3700000001 HC ADD 15 MINUTES (ANESTHESIA): Performed by: OTOLARYNGOLOGY

## 2022-08-22 PROCEDURE — 7100000011 HC PHASE II RECOVERY - ADDTL 15 MIN: Performed by: OTOLARYNGOLOGY

## 2022-08-22 RX ORDER — MEPERIDINE HYDROCHLORIDE 25 MG/ML
12.5 INJECTION INTRAMUSCULAR; INTRAVENOUS; SUBCUTANEOUS EVERY 5 MIN PRN
Status: CANCELLED | OUTPATIENT
Start: 2022-08-22

## 2022-08-22 RX ORDER — DIPHENHYDRAMINE HYDROCHLORIDE 50 MG/ML
12.5 INJECTION INTRAMUSCULAR; INTRAVENOUS
Status: CANCELLED | OUTPATIENT
Start: 2022-08-22 | End: 2022-08-22

## 2022-08-22 RX ORDER — FENTANYL CITRATE 50 UG/ML
50 INJECTION, SOLUTION INTRAMUSCULAR; INTRAVENOUS EVERY 5 MIN PRN
Status: CANCELLED | OUTPATIENT
Start: 2022-08-22

## 2022-08-22 RX ORDER — LIDOCAINE HYDROCHLORIDE AND EPINEPHRINE 10; 10 MG/ML; UG/ML
INJECTION, SOLUTION INFILTRATION; PERINEURAL PRN
Status: DISCONTINUED | OUTPATIENT
Start: 2022-08-22 | End: 2022-08-22 | Stop reason: ALTCHOICE

## 2022-08-22 RX ORDER — HYDROCODONE BITARTRATE AND ACETAMINOPHEN 5; 325 MG/1; MG/1
1 TABLET ORAL EVERY 6 HOURS PRN
Qty: 12 TABLET | Refills: 0 | Status: SHIPPED | OUTPATIENT
Start: 2022-08-22 | End: 2022-08-25

## 2022-08-22 RX ORDER — ONDANSETRON 2 MG/ML
INJECTION INTRAMUSCULAR; INTRAVENOUS PRN
Status: DISCONTINUED | OUTPATIENT
Start: 2022-08-22 | End: 2022-08-22 | Stop reason: SDUPTHER

## 2022-08-22 RX ORDER — FENTANYL CITRATE 50 UG/ML
INJECTION, SOLUTION INTRAMUSCULAR; INTRAVENOUS PRN
Status: DISCONTINUED | OUTPATIENT
Start: 2022-08-22 | End: 2022-08-22 | Stop reason: SDUPTHER

## 2022-08-22 RX ORDER — SODIUM CHLORIDE 0.9 % (FLUSH) 0.9 %
5-40 SYRINGE (ML) INJECTION EVERY 12 HOURS SCHEDULED
Status: CANCELLED | OUTPATIENT
Start: 2022-08-22

## 2022-08-22 RX ORDER — SODIUM CHLORIDE 0.9 % (FLUSH) 0.9 %
5-40 SYRINGE (ML) INJECTION PRN
Status: DISCONTINUED | OUTPATIENT
Start: 2022-08-22 | End: 2022-08-22 | Stop reason: HOSPADM

## 2022-08-22 RX ORDER — PROPOFOL 10 MG/ML
INJECTION, EMULSION INTRAVENOUS CONTINUOUS PRN
Status: DISCONTINUED | OUTPATIENT
Start: 2022-08-22 | End: 2022-08-22 | Stop reason: SDUPTHER

## 2022-08-22 RX ORDER — ONDANSETRON 2 MG/ML
4 INJECTION INTRAMUSCULAR; INTRAVENOUS
Status: CANCELLED | OUTPATIENT
Start: 2022-08-22 | End: 2022-08-22

## 2022-08-22 RX ORDER — SODIUM CHLORIDE 0.9 % (FLUSH) 0.9 %
5-40 SYRINGE (ML) INJECTION PRN
Status: CANCELLED | OUTPATIENT
Start: 2022-08-22

## 2022-08-22 RX ORDER — MIDAZOLAM HYDROCHLORIDE 1 MG/ML
INJECTION INTRAMUSCULAR; INTRAVENOUS PRN
Status: DISCONTINUED | OUTPATIENT
Start: 2022-08-22 | End: 2022-08-22 | Stop reason: SDUPTHER

## 2022-08-22 RX ORDER — GINSENG 100 MG
CAPSULE ORAL PRN
Status: DISCONTINUED | OUTPATIENT
Start: 2022-08-22 | End: 2022-08-22 | Stop reason: ALTCHOICE

## 2022-08-22 RX ORDER — LIDOCAINE HYDROCHLORIDE 20 MG/ML
INJECTION, SOLUTION EPIDURAL; INFILTRATION; INTRACAUDAL; PERINEURAL PRN
Status: DISCONTINUED | OUTPATIENT
Start: 2022-08-22 | End: 2022-08-22 | Stop reason: SDUPTHER

## 2022-08-22 RX ORDER — SODIUM CHLORIDE 9 MG/ML
INJECTION, SOLUTION INTRAVENOUS PRN
Status: DISCONTINUED | OUTPATIENT
Start: 2022-08-22 | End: 2022-08-22 | Stop reason: HOSPADM

## 2022-08-22 RX ORDER — SODIUM CHLORIDE 0.9 % (FLUSH) 0.9 %
5-40 SYRINGE (ML) INJECTION EVERY 12 HOURS SCHEDULED
Status: DISCONTINUED | OUTPATIENT
Start: 2022-08-22 | End: 2022-08-22 | Stop reason: HOSPADM

## 2022-08-22 RX ORDER — SODIUM CHLORIDE 9 MG/ML
INJECTION, SOLUTION INTRAVENOUS PRN
Status: CANCELLED | OUTPATIENT
Start: 2022-08-22

## 2022-08-22 RX ORDER — PROPOFOL 10 MG/ML
INJECTION, EMULSION INTRAVENOUS PRN
Status: DISCONTINUED | OUTPATIENT
Start: 2022-08-22 | End: 2022-08-22 | Stop reason: SDUPTHER

## 2022-08-22 RX ORDER — DEXAMETHASONE SODIUM PHOSPHATE 10 MG/ML
INJECTION, EMULSION INTRAMUSCULAR; INTRAVENOUS PRN
Status: DISCONTINUED | OUTPATIENT
Start: 2022-08-22 | End: 2022-08-22 | Stop reason: SDUPTHER

## 2022-08-22 RX ADMIN — MIDAZOLAM 2 MG: 1 INJECTION INTRAMUSCULAR; INTRAVENOUS at 11:16

## 2022-08-22 RX ADMIN — FENTANYL CITRATE 50 MCG: 50 INJECTION, SOLUTION INTRAMUSCULAR; INTRAVENOUS at 11:39

## 2022-08-22 RX ADMIN — LIDOCAINE HYDROCHLORIDE 80 MG: 20 INJECTION, SOLUTION EPIDURAL; INFILTRATION; INTRACAUDAL; PERINEURAL at 11:20

## 2022-08-22 RX ADMIN — ONDANSETRON 4 MG: 2 INJECTION INTRAMUSCULAR; INTRAVENOUS at 11:27

## 2022-08-22 RX ADMIN — SODIUM CHLORIDE: 9 INJECTION, SOLUTION INTRAVENOUS at 11:16

## 2022-08-22 RX ADMIN — PROPOFOL 30 MG: 10 INJECTION, EMULSION INTRAVENOUS at 11:21

## 2022-08-22 RX ADMIN — PROPOFOL 100 MCG/KG/MIN: 10 INJECTION, EMULSION INTRAVENOUS at 11:21

## 2022-08-22 RX ADMIN — DEXAMETHASONE SODIUM PHOSPHATE 10 MG: 10 INJECTION, EMULSION INTRAMUSCULAR; INTRAVENOUS at 11:27

## 2022-08-22 RX ADMIN — PROPOFOL 30 MG: 10 INJECTION, EMULSION INTRAVENOUS at 11:41

## 2022-08-22 RX ADMIN — FENTANYL CITRATE 50 MCG: 50 INJECTION, SOLUTION INTRAMUSCULAR; INTRAVENOUS at 11:23

## 2022-08-22 RX ADMIN — PROPOFOL 30 MG: 10 INJECTION, EMULSION INTRAVENOUS at 11:39

## 2022-08-22 RX ADMIN — PROPOFOL 30 MG: 10 INJECTION, EMULSION INTRAVENOUS at 11:24

## 2022-08-22 ASSESSMENT — PAIN SCALES - GENERAL: PAINLEVEL_OUTOF10: 0

## 2022-08-22 ASSESSMENT — LIFESTYLE VARIABLES: SMOKING_STATUS: 0

## 2022-08-22 ASSESSMENT — PAIN - FUNCTIONAL ASSESSMENT: PAIN_FUNCTIONAL_ASSESSMENT: NONE - DENIES PAIN

## 2022-08-22 NOTE — PROGRESS NOTES
64 [address] George. DENIES PAIN ON ARRIVAL . O2 SAT 96%  DRESSING RIGHT EAR DRY AND INTACT. 1240 WATER GIVEN PER  REQUEST.  1300  IV D/C' D T .GETTING DRESSED.  1310 WAITING ON RIDE  1322 DISCHARGE INSTRUCTIONS GIVEN TO PATIENT AND SON VERBALIZE UNDERSTANDING  1325 DISCHARGED HOME VIA PRIVATE CAR WITHOUT COMPLAINT.

## 2022-08-22 NOTE — ANESTHESIA PRE PROCEDURE
Department of Anesthesiology  Preprocedure Note       Name:  Adryan Story   Age:  74 y.o.  :  1955                                          MRN:  [id number]         Date:  2022      Surgeon: Federica Signs):  Mehdi Haskins MD    Procedure: Procedure(s):  RIGHT EAR MEATOPLASTY    Medications prior to admission:   Prior to Admission medications    Medication Sig Start Date End Date Taking?  Authorizing Provider   Cholecalciferol (VITAMIN D3) 1.25 MG (25555 UT) TABS Take by mouth daily    Historical Provider, MD   Multiple Vitamins-Minerals (MULTIVITAMIN ADULT PO) Take by mouth daily    Historical Provider, MD       Current medications:    Current Facility-Administered Medications   Medication Dose Route Frequency Provider Last Rate Last Admin    sodium chloride flush 0.9 % injection 5-40 mL  5-40 mL IntraVENous 2 times per day Mehdi Haskins MD        sodium chloride flush 0.9 % injection 5-40 mL  5-40 mL IntraVENous PRN Mehdi Haskins MD        0.9 % sodium chloride infusion   IntraVENous PRN Mehdi Haskins MD           Allergies:  No Known Allergies    Problem List:    Patient Active Problem List   Diagnosis Code    Ureteral stone N20.1    Stenosis of right external auditory canal H61.301       Past Medical History:        Diagnosis Date    Kidney stones        Past Surgical History:        Procedure Laterality Date    BREAST ENHANCEMENT SURGERY Bilateral 2010    Saline implants    CYSTOSCOPY Right 2020    CYSTOSCOPY, RIGHT URETEROSCOPY, BASKET RETRIEVAL OF STONE, AND RIGHT URETERAL STENT INSERTION performed by Barbra Shetty MD at [address] [place] [address]         Social History:    Social History     Tobacco Use    Smoking status: Never    Smokeless tobacco: Never   Substance Use Topics    Alcohol use: Yes     Comment: rare                                Counseling given: Not Answered      Vital Signs (Current):   Vitals:    08/15/22 1255 22 0944   BP:  121/67 Pulse:  73   Resp:  16   Temp:  98.2 °F (36.8 °C)   TempSrc:  Temporal   SpO2:  99%   Weight: 115 lb (52.2 kg) 115 lb (52.2 kg)   Height: 5' 6\" (1.676 m) 5' 6\" (1.676 m)                                              BP Readings from Last 3 Encounters:   08/22/22 121/67   07/26/22 122/72   06/23/22 122/74       NPO Status: Time of last liquid consumption: 2330                        Time of last solid consumption: 1800                        Date of last liquid consumption: 08/21/22                        Date of last solid food consumption: 08/21/22    BMI:   Wt Readings from Last 3 Encounters:   08/22/22 115 lb (52.2 kg)   07/26/22 115 lb 6.4 oz (52.3 kg)   06/23/22 115 lb 3.2 oz (52.3 kg)     Body mass index is 18.56 kg/m². CBC:   Lab Results   Component Value Date/Time    WBC 5.1 07/29/2022 10:35 AM    WBC 5.8 11/12/2021 12:29 PM    RBC 4.30 07/29/2022 10:35 AM    HGB 13.1 07/29/2022 10:35 AM    HCT 39.3 07/29/2022 10:35 AM    MCV 91.4 07/29/2022 10:35 AM    RDW 12.2 07/29/2022 10:35 AM     07/29/2022 10:35 AM     11/12/2021 12:29 PM       CMP:   Lab Results   Component Value Date/Time     11/12/2021 12:29 PM    K 4.7 11/12/2021 12:29 PM    K 4.4 07/19/2020 08:33 AM     11/12/2021 12:29 PM    CO2 26 11/12/2021 12:29 PM    BUN 13 11/12/2021 12:29 PM    CREATININE 0.6 11/12/2021 12:29 PM    LABGLOM >90 11/12/2021 12:29 PM    GLUCOSE 95 11/12/2021 12:29 PM    PROT 7.3 11/12/2021 12:29 PM    CALCIUM 9.9 11/12/2021 12:29 PM    BILITOT 0.4 11/12/2021 12:29 PM    ALKPHOS 86 11/12/2021 12:29 PM    AST 24 11/12/2021 12:29 PM    ALT 26 11/12/2021 12:29 PM       POC Tests: No results for input(s): POCGLU, POCNA, POCK, POCCL, POCBUN, POCHEMO, POCHCT in the last 72 hours.     Coags: No results found for: PROTIME, INR, APTT    HCG (If Applicable): No results found for: PREGTESTUR, PREGSERUM, HCG, HCGQUANT     ABGs: No results found for: PHART, PO2ART, RQM7NOD, LMQ3RQF, BEART, U1ACYMVR     Type & Screen (If Applicable):  No results found for: LABABO, LABRH    Drug/Infectious Status (If Applicable):  No results found for: HIV, HEPCAB    COVID-19 Screening (If Applicable):   Lab Results   Component Value Date/Time    COVID19 NOT DETECTED 07/19/2020 02:28 AM           Anesthesia Evaluation   no history of anesthetic complications:   Airway: Mallampati: II  TM distance: >3 FB   Neck ROM: full  Mouth opening: > = 3 FB   Dental: normal exam         Pulmonary:normal exam        (-) COPD, asthma and not a current smoker                           Cardiovascular:Negative CV ROS  Exercise tolerance: good (>4 METS),       (-) hypertension, past MI and CAD                Neuro/Psych:      (-) seizures and CVA           GI/Hepatic/Renal:        (-) GERD, liver disease and no renal disease       Endo/Other:        (-) diabetes mellitus, hypothyroidism, hyperthyroidism               Abdominal:             Vascular:     - DVT and PE. Other Findings:           Anesthesia Plan      MAC     ASA 1       Induction: intravenous. Anesthetic plan and risks discussed with patient. Plan discussed with CRNA.                     Kecia Davis DO   8/22/2022

## 2022-08-22 NOTE — BRIEF OP NOTE
Brief Postoperative Note      Patient: Laith Lara  YOB: 1955  MRN: [id number]    Date of Procedure: 8/22/2022    Pre-Op Diagnosis: Acquired stenosis of right external ear canal [H61.301]    Post-Op Diagnosis: Same       Procedure(s):  RIGHT EAR MEATOPLASTY    Surgeon(s):  Jania Masterson MD    Assistant:  * No surgical staff found *    Anesthesia: Monitor Anesthesia Care    Estimated Blood Loss (mL): Minimal    Complications: None    Specimens:   * No specimens in log *    Implants:  * No implants in log *      Drains: * No LDAs found *    Findings: Encroachment of the conchal cartilage forward creating a very slitlike external auditory meatus. The medial canal was narrow like the other side and that is a bony structure. Bone was left untouched.     Electronically signed by Angela Macedo MD on 8/22/2022 at 12:37 PM

## 2022-08-22 NOTE — ANESTHESIA POSTPROCEDURE EVALUATION
Department of Anesthesiology  Postprocedure Note    Patient: Mo Smith  MRN: [id number]  YOB: 1955  Date of evaluation: 8/22/2022      Procedure Summary     Date: 08/22/22 [facility]    Anesthesia Start: 1114 Anesthesia Stop: 3200    Procedure: RIGHT EAR MEATOPLASTY (Neck) Diagnosis:       Acquired stenosis of right external ear canal      (Acquired stenosis of right external ear canal [H61.301])    Surgeons: Alysha Montoya MD Responsible Provider: Trav Fields DO    Anesthesia Type: MAC ASA Status: 1          Anesthesia Type: No value filed.     Uyen Phase I:      Uyen Phase II:        Anesthesia Post Evaluation    Patient location during evaluation: bedside  Patient participation: complete - patient participated  Level of consciousness: awake and alert  Airway patency: patent  Nausea & Vomiting: no vomiting and no nausea  Complications: no  Cardiovascular status: hemodynamically stable  Respiratory status: acceptable  Hydration status: stable

## 2022-08-22 NOTE — DISCHARGE INSTRUCTIONS
Leave dressing in place until office visit next day.   Prescription for hydrocodone and Tylenol at pharmacy

## 2022-08-22 NOTE — INTERVAL H&P NOTE
Pt Name: Magdalena Moses  MRN: [id number]  YOB: 1955  Date of evaluation: 8/22/2022    I have examined the patient and reviewed the H&P/Consult and there are no changes to the patient or plans.          Electronically signed by Nell Xiong MD on 8/22/2022 at 11:17 AM

## 2022-08-23 ENCOUNTER — OFFICE VISIT (OUTPATIENT)
Dept: ENT CLINIC | Age: 67
End: 2022-08-23

## 2022-08-23 VITALS
TEMPERATURE: 97.1 F | HEIGHT: 66 IN | BODY MASS INDEX: 18.77 KG/M2 | OXYGEN SATURATION: 97 % | DIASTOLIC BLOOD PRESSURE: 70 MMHG | WEIGHT: 116.8 LBS | SYSTOLIC BLOOD PRESSURE: 126 MMHG | HEART RATE: 63 BPM

## 2022-08-23 DIAGNOSIS — H61.301 STENOSIS OF RIGHT EXTERNAL AUDITORY CANAL: Primary | ICD-10-CM

## 2022-08-23 PROCEDURE — 99024 POSTOP FOLLOW-UP VISIT: CPT | Performed by: OTOLARYNGOLOGY

## 2022-08-23 ASSESSMENT — ENCOUNTER SYMPTOMS
WHEEZING: 0
COUGH: 0
CHEST TIGHTNESS: 0
SORE THROAT: 0
DIARRHEA: 0
SINUS PRESSURE: 0
SHORTNESS OF BREATH: 0
NAUSEA: 0
RHINORRHEA: 0
APNEA: 0
STRIDOR: 0
VOMITING: 0
TROUBLE SWALLOWING: 0
VOICE CHANGE: 0
ABDOMINAL PAIN: 0
COLOR CHANGE: 0
CHOKING: 0
FACIAL SWELLING: 0

## 2022-08-23 NOTE — PROGRESS NOTES
[facility] [place] [facility]  [facility] EAR, NOSE AND THROAT  [address] [place] [address] [address] [address]  Dept: [phone]  Dept Fax: [phone]  Loc: [phone]    Olivia Lees is a 74 y.o. female who was referred byNo ref. provider found for:  Chief Complaint   Patient presents with    Post-Op Check     Patient is here post-op right ear meatoplasty 8/22/22   . HPI:     Olivia Lees is a 74 y.o. female who presents today for follow-up check on surgery yesterday. She had a meatoplasty right ear canal had a mastoid dressing which started to slip off. She feels fine. Lovely Crumble History:     No Known Allergies  Current Outpatient Medications   Medication Sig Dispense Refill    Cholecalciferol (VITAMIN D3) 1.25 MG (83558 UT) TABS Take by mouth daily (Patient not taking: Reported on 8/23/2022)      Multiple Vitamins-Minerals (MULTIVITAMIN ADULT PO) Take by mouth daily (Patient not taking: Reported on 8/23/2022)       No current facility-administered medications for this visit.      Past Medical History:   Diagnosis Date    Kidney stones       Past Surgical History:   Procedure Laterality Date    BREAST ENHANCEMENT SURGERY Bilateral 2010    Saline implants    CYSTOSCOPY Right 7/19/2020    CYSTOSCOPY, RIGHT URETEROSCOPY, BASKET RETRIEVAL OF STONE, AND RIGHT URETERAL STENT INSERTION performed by Pacheco Matson MD at [address] [facility] N/A 8/22/2022    RIGHT EAR MEATOPLASTY performed by Scot Qureshi MD at [address] EXTRACTION       Family History   Problem Relation Age of Onset    Cancer Mother     Breast Cancer Mother 80    Diabetes Neg Hx     Heart Disease Neg Hx     High Blood Pressure Neg Hx     Stroke Neg Hx      Social History     Tobacco Use    Smoking status: Never    Smokeless tobacco: Never   Substance Use Topics    Alcohol use: Yes     Comment: rare       Subjective:      Review of Systems   Constitutional:  Negative for activity change, appetite change, chills, diaphoresis, fatigue, fever and unexpected weight change. HENT:  Negative for congestion, dental problem, ear discharge, ear pain, facial swelling, hearing loss, mouth sores, nosebleeds, postnasal drip, rhinorrhea, sinus pressure, sneezing, sore throat, tinnitus, trouble swallowing and voice change. Eyes:  Negative for visual disturbance. Respiratory:  Negative for apnea, cough, choking, chest tightness, shortness of breath, wheezing and stridor. Cardiovascular:  Negative for chest pain, palpitations and leg swelling. Gastrointestinal:  Negative for abdominal pain, diarrhea, nausea and vomiting. Endocrine: Negative for cold intolerance, heat intolerance, polydipsia and polyuria. Genitourinary:  Negative for dysuria, enuresis and hematuria. Musculoskeletal:  Negative for arthralgias, gait problem, neck pain and neck stiffness. Skin:  Negative for color change and rash. Allergic/Immunologic: Negative for environmental allergies, food allergies and immunocompromised state. Neurological:  Negative for dizziness, syncope, facial asymmetry, speech difficulty, light-headedness and headaches. Hematological:  Negative for adenopathy. Does not bruise/bleed easily. Psychiatric/Behavioral:  Negative for confusion and sleep disturbance. The patient is not nervous/anxious. Objective:   /70 (Site: Left Upper Arm, Position: Sitting)   Pulse 63   Temp 97.1 °F (36.2 °C) (Infrared)   Ht 5' 6\" (1.676 m)   Wt 116 lb 12.8 oz (53 kg)   LMP 11/16/2017   SpO2 97%   BMI 18.85 kg/m²     Physical Exam  Mastoid dressing is removed. Suture holding the rubber band and drain in place was cut and the drain was removed. There was no erythema or fluid collections. Data:  All of the past medical history, past surgical history, family history,social history, allergies and current medications were reviewed with the patient.      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 Patient should use a Q-tip and peroxide and keep all crusts off and come back in a few days to remove the sutures and apply Steri-Strips. Jessica Sousa. Eugenia Metzger MD    **This report has been created using voice recognition software. It may contain minor errors which are inherent in voicerecognition technology. **

## 2022-08-23 NOTE — OP NOTE
[address] [place] [address]                                OPERATIVE REPORT    PATIENT NAME: Татьяна López                  :        1955  MED REC NO:   [id number]                           ROOM:  ACCOUNT NO:   [de-identified]                           ADMIT DATE: 2022  PROVIDER:     Maninder Mendoza. Stacie Elkins M.D.    Tristan Garciaster:  2022    OPERATION:  Right meatoplasty. SURGEON:  Maninder Mendoza. Stacie Elkins MD    ANESTHESIA:  Monitored anesthesia control with local anesthesia. PREOPERATIVE DIAGNOSIS:  Acquired stenosis right external auditory canal  at the meatus. POSTOPERATIVE DIAGNOSIS:  Acquired stenosis right external auditory  canal at the meatus. HISTORY AND OPERATIVE FINDINGS:  This 77-year-old female has a narrow  ear canal each side, but on the right side, the conchal bowl cartilage  had grown to the point where the external auditory meatus was a very  narrow slit like opening, about a 1 mm or may be 2 in greatest width. She was accumulating cerumen impaction and having external otitis. She  had been seen by our department three years ago with this condition of  cerumen impaction and infection. The conchal cartilage was resected percutaneously in a crescent shape  and the excess skin trimmed after draping it up to the insertion and the  wound closed with 5-0 nylon. There was sufficient distance between  stitches inferiorly and superiorly to allow for adequate draining. Mastoid dressing was used. ESTIMATED BLOOD LOSS:  Minimal.    DESCRIPTION OF PROCEDURE:  After adequate level of sedation had been  obtained, a buffered 1% lidocaine with epinephrine was injected on the  base of the ear to lock the greater auricular nerve and then in the  region of the flap and conchal bowl. This was allowed to take effect. The ear has been carefully prepped and cleaned.   Skin line incision was  marked out in the conchal bowl marking from the top of the external  auditory meatus around to the inferior aspect of the external auditory  meatus. Incision was carried down through to the cartilage and  perichondrium. Perichondrium was elevated forward along with the skin. The cartilage present was excised and dissected away from underlying  perichondrium. With general traction, the skin flap thus created was drawn posteriorly  over the edge of the wound. Incisions were made up to those points in  3-4 places and then connected to give an accurate trimming. The  incision was closed with interrupted simple 5-0 nylon sutures. Care was  taken to make sure there was no fluid in the ear canal and then conchal  bowl, impregnated bacitracin ointment was placed in the conchal bowl and  external auditory meatus. Mastoid dressing was applied. The patient's  sedation was allowed to wear off. She tolerated the procedure well. Blood loss was negligible and I  expect _____. She was taken to Recovery in satisfactory condition. There were no complications.         Lorri Grande M.D.    D: 08/22/2022 19:02:35       T: 08/23/2022 0:01:09     [email or web address]  Job#: [id number]     Doc#: [id number]    CC:  Sara Morataya M.D.

## 2022-08-26 ENCOUNTER — OFFICE VISIT (OUTPATIENT)
Dept: ENT CLINIC | Age: 67
End: 2022-08-26

## 2022-08-26 VITALS
WEIGHT: 116.1 LBS | HEART RATE: 75 BPM | DIASTOLIC BLOOD PRESSURE: 78 MMHG | OXYGEN SATURATION: 98 % | BODY MASS INDEX: 18.66 KG/M2 | TEMPERATURE: 97 F | RESPIRATION RATE: 14 BRPM | HEIGHT: 66 IN | SYSTOLIC BLOOD PRESSURE: 110 MMHG

## 2022-08-26 DIAGNOSIS — H61.301 STENOSIS OF RIGHT EXTERNAL AUDITORY CANAL: Primary | ICD-10-CM

## 2022-08-26 PROCEDURE — 99024 POSTOP FOLLOW-UP VISIT: CPT | Performed by: OTOLARYNGOLOGY

## 2022-09-26 NOTE — PROGRESS NOTES
Patient is status post external auditory meatoplasty. She is doing well. Wound has no sign of infection. Sutures were removed and Steri-Strip applied. Local wound care gently with peroxide and a Q-tip to remove any crusts. She should call us if she has any problems with wound healing. The meatus is much larger and she was quite pleased when I showed her what it look like.     Return as needed

## 2023-02-22 ENCOUNTER — TELEPHONE (OUTPATIENT)
Dept: UROLOGY | Age: 68
End: 2023-02-22

## 2023-02-22 NOTE — TELEPHONE ENCOUNTER
Patient scheduled for US RENAL LIMITED  at [facility] MR on 5/25/2023 ARRIVAL OF 715AM FOR A 730AM SCAN. NO CARBONATED BEVERAGES; ARRIVE WELL HYDRATED WITH A FULL BLADDER.     Order mailed with instructions to the patient None

## 2023-03-01 ENCOUNTER — HOSPITAL ENCOUNTER (OUTPATIENT)
Dept: WOMENS IMAGING | Age: 68
Discharge: HOME | End: 2023-03-01
Payer: MEDICARE

## 2023-03-01 DIAGNOSIS — Z12.31 VISIT FOR SCREENING MAMMOGRAM: ICD-10-CM

## 2023-03-01 PROCEDURE — 77063 BREAST TOMOSYNTHESIS BI: CPT

## 2023-04-04 NOTE — ED PROVIDER NOTES
[address] [facility]  EMERGENCY MEDICINE ATTENDING ATTESTATION      Evaluation of Anna Pleitez. Case discussed and care plan developed with resident physician. I agree with the resident physician documentation and plan as documented by him, except if my documentation differs. Patient seen, interviewed and examined by me. I reviewed the medical, surgical, family and social history, medications and allergies. I have reviewed the nursing documentation. I have reviewed the patient's vital signs and are normal per my interpretation. Body mass index is 18.56 kg/m². Pulsoxymetry is normal per my interpretation. Brief H&P   Patient c/o right flank pain and urgency. Patient was seen in urgent care earlier today where a UA was done and did not show signs of infection at the moment. She was then referred to emergency department for evaluation for possible kidney stones. Physical exam is notable for well appearing, right CVA tenderness, otherwise nonfocal exam.      Medical Decision Making   MDM:   1. Possible UTI/pyelonephritis  2. Rule out kidney stones  3. Undifferentiated flank pain  Plan:    I V line, labs   Analgesia as needed   Imaging   Observation    Please see the resident physician completed note for final disposition except as documented on this attestation. I have reviewed and interpreted all available lab, radiology and ekg results available at the moment. Diagnosis, treatment and disposition plans were discussed and agreed upon by patient. This transcription was electronically signed. It was dictated by use of voice recognition software and electronically transcribed. The transcription may contain errors not detected in proofreading.      I performed direct supervision and was present for the critical portion following procedures: None  Critical care time on this case: None    Electronically signed by Marianne Kenny MD on 11/12/21 at 1:53 PM EST Jenise Carrion MD  11/12/21 6535 Gram-negative pneumonia

## 2023-05-25 ENCOUNTER — HOSPITAL ENCOUNTER (OUTPATIENT)
Dept: ULTRASOUND IMAGING | Age: 68
Discharge: HOME | End: 2023-05-25
Payer: MEDICARE

## 2023-05-25 DIAGNOSIS — N28.1 RENAL CYST: ICD-10-CM

## 2023-05-25 DIAGNOSIS — N20.0 KIDNEY STONES: ICD-10-CM

## 2023-05-25 PROCEDURE — 76770 US EXAM ABDO BACK WALL COMP: CPT

## 2023-07-06 ENCOUNTER — OFFICE VISIT (OUTPATIENT)
Dept: UROLOGY | Age: 68
End: 2023-07-06
Payer: MEDICARE

## 2023-07-06 VITALS — HEIGHT: 66 IN | BODY MASS INDEX: 18.64 KG/M2 | RESPIRATION RATE: 16 BRPM | WEIGHT: 116 LBS

## 2023-07-06 DIAGNOSIS — N28.1 RENAL CYST: ICD-10-CM

## 2023-07-06 DIAGNOSIS — R31.21 ASYMPTOMATIC MICROSCOPIC HEMATURIA: ICD-10-CM

## 2023-07-06 DIAGNOSIS — N20.0 NEPHROLITHIASIS: Primary | ICD-10-CM

## 2023-07-06 PROCEDURE — 1123F ACP DISCUSS/DSCN MKR DOCD: CPT | Performed by: UROLOGY

## 2023-07-06 PROCEDURE — 99214 OFFICE O/P EST MOD 30 MIN: CPT | Performed by: UROLOGY

## 2023-07-06 RX ORDER — CALCIUM CARBONATE 500(1250)
500 TABLET ORAL DAILY
COMMUNITY

## 2023-07-06 NOTE — PROGRESS NOTES
[address] [facility]  [address]  [facility] [address]  Dept: [phone]  Loc: [phone]  Visit Date: 7/6/2023      HPI:     Seema Brunson is a 79 y.o. with past medical history as listed below who presents today in follow-up for ER follow up. Hx of kidney stones. Sees  [facility]. Hx of negative work up for microscopic hematuria. Concerned about having new stones since last CT as she was told by ER physician. 2 weeks ago had nausea, fevers and left flank pain  Later that day felt better  No other recurrences    CT 11/2021  KIDNEYS/URETERS/BLADDER: A 3 mm nonobstructing calculus in the midpole of the left kidney is stable. Punctate nonobstructing calculi in the lower pole of the left kidney appear unchanged. Several 2 to 3 mm nonobstructing calculi are visualized in the    midpole of the right kidney. No hydronephrosis or hydroureter is observed. I independently reviewed and verified the images and reports from:    XR ABDOMEN (KUB) (SINGLE AP VIEW)    Result Date: 5/23/2022  PROCEDURE: XR ABDOMEN (KUB) (SINGLE AP VIEW) CLINICAL INFORMATION: Kidney stones. COMPARISON: CT scan of the abdomen dated 12 November 2021. KUB dated 15th of June 2021. . TECHNIQUE: A supine AP view of the abdomen was obtained. FINDINGS: There are no definite stones overlying the kidneys, ureters or bladder. There is a moderate amount of stool throughout the colon. There is mild dilatation of the small bowel loop in the left midabdomen. There are no distended bowel loops. There are no displaced bowel loops. There is no gross free air. No suspicious densities are present. No suspicious osseous lesions are present. 1. Punctate bilateral renal stones seen on CT scan of the abdomen dated 12 November 2021 are not clearly seen on the KUB. 2. Moderate amount of stool throughout the colon. Mild dilatation of a small bowel loop  in the left midabdomen.  **This

## 2023-08-31 ENCOUNTER — TELEPHONE (OUTPATIENT)
Dept: UROLOGY | Age: 68
End: 2023-08-31

## 2024-03-07 ENCOUNTER — HOSPITAL ENCOUNTER (OUTPATIENT)
Dept: WOMENS IMAGING | Age: 69
Discharge: HOME | End: 2024-03-07
Payer: MEDICARE

## 2024-03-07 DIAGNOSIS — Z12.31 VISIT FOR SCREENING MAMMOGRAM: ICD-10-CM

## 2024-03-07 PROCEDURE — 77063 BREAST TOMOSYNTHESIS BI: CPT

## 2025-04-10 ENCOUNTER — HOSPITAL ENCOUNTER (OUTPATIENT)
Dept: WOMENS IMAGING | Age: 70
Discharge: HOME | End: 2025-04-10
Payer: MEDICARE

## 2025-04-10 VITALS — WEIGHT: 115 LBS | BODY MASS INDEX: 18.48 KG/M2 | HEIGHT: 66 IN

## 2025-04-10 DIAGNOSIS — Z13.820 ENCOUNTER FOR SCREENING FOR OSTEOPOROSIS: ICD-10-CM

## 2025-04-10 DIAGNOSIS — Z78.0 POST-MENOPAUSAL: ICD-10-CM

## 2025-04-10 DIAGNOSIS — Z12.31 ENCOUNTER FOR SCREENING MAMMOGRAM FOR MALIGNANT NEOPLASM OF BREAST: ICD-10-CM

## 2025-04-10 PROCEDURE — 77080 DXA BONE DENSITY AXIAL: CPT

## 2025-04-10 PROCEDURE — 77063 BREAST TOMOSYNTHESIS BI: CPT

## 2025-04-18 ENCOUNTER — HOSPITAL ENCOUNTER (OUTPATIENT)
Dept: WOMENS IMAGING | Age: 70
Discharge: HOME | End: 2025-04-18
Attending: RADIOLOGY
Payer: MEDICARE

## 2025-04-18 DIAGNOSIS — R92.8 ABNORMAL MAMMOGRAM: ICD-10-CM

## 2025-04-18 PROCEDURE — G0279 TOMOSYNTHESIS, MAMMO: HCPCS

## 2025-04-18 PROCEDURE — 76642 ULTRASOUND BREAST LIMITED: CPT

## 2025-04-21 DIAGNOSIS — R92.30 BREAST DENSITY: Primary | ICD-10-CM

## 2025-04-21 DIAGNOSIS — Z09 FOLLOW-UP EXAM: ICD-10-CM

## 2025-04-21 DIAGNOSIS — R92.8 ABNORMAL MAMMOGRAM: ICD-10-CM

## 2025-05-05 ENCOUNTER — OFFICE VISIT (OUTPATIENT)
Dept: FAMILY MEDICINE CLINIC | Age: 70
End: 2025-05-05
Payer: MEDICARE

## 2025-05-05 VITALS
SYSTOLIC BLOOD PRESSURE: 98 MMHG | TEMPERATURE: 97.9 F | WEIGHT: 114.4 LBS | RESPIRATION RATE: 12 BRPM | HEART RATE: 72 BPM | HEIGHT: 66 IN | DIASTOLIC BLOOD PRESSURE: 70 MMHG | BODY MASS INDEX: 18.39 KG/M2

## 2025-05-05 DIAGNOSIS — R53.83 OTHER FATIGUE: ICD-10-CM

## 2025-05-05 DIAGNOSIS — Z00.00 INITIAL MEDICARE ANNUAL WELLNESS VISIT: Primary | ICD-10-CM

## 2025-05-05 DIAGNOSIS — M81.0 AGE-RELATED OSTEOPOROSIS WITHOUT CURRENT PATHOLOGICAL FRACTURE: ICD-10-CM

## 2025-05-05 DIAGNOSIS — E78.5 DYSLIPIDEMIA: ICD-10-CM

## 2025-05-05 DIAGNOSIS — N95.1 MENOPAUSAL VASOMOTOR SYNDROME: ICD-10-CM

## 2025-05-05 PROBLEM — N20.1 URETERAL STONE: Status: RESOLVED | Noted: 2020-07-19 | Resolved: 2025-05-05

## 2025-05-05 PROCEDURE — G0438 PPPS, INITIAL VISIT: HCPCS | Performed by: FAMILY MEDICINE

## 2025-05-05 PROCEDURE — 1160F RVW MEDS BY RX/DR IN RCRD: CPT | Performed by: FAMILY MEDICINE

## 2025-05-05 PROCEDURE — 1159F MED LIST DOCD IN RCRD: CPT | Performed by: FAMILY MEDICINE

## 2025-05-05 PROCEDURE — 1123F ACP DISCUSS/DSCN MKR DOCD: CPT | Performed by: FAMILY MEDICINE

## 2025-05-05 RX ORDER — VITAMIN B COMPLEX
2000 TABLET ORAL DAILY
COMMUNITY

## 2025-05-05 SDOH — ECONOMIC STABILITY: FOOD INSECURITY: WITHIN THE PAST 12 MONTHS, THE FOOD YOU BOUGHT JUST DIDN'T LAST AND YOU DIDN'T HAVE MONEY TO GET MORE.: NEVER TRUE

## 2025-05-05 SDOH — ECONOMIC STABILITY: FOOD INSECURITY: WITHIN THE PAST 12 MONTHS, YOU WORRIED THAT YOUR FOOD WOULD RUN OUT BEFORE YOU GOT MONEY TO BUY MORE.: NEVER TRUE

## 2025-05-05 ASSESSMENT — LIFESTYLE VARIABLES
HOW OFTEN DO YOU HAVE A DRINK CONTAINING ALCOHOL: MONTHLY OR LESS
HOW MANY STANDARD DRINKS CONTAINING ALCOHOL DO YOU HAVE ON A TYPICAL DAY: 1 OR 2

## 2025-05-05 ASSESSMENT — VISUAL ACUITY
OD_CC: 20/25
OS_CC: 20/20

## 2025-05-05 ASSESSMENT — PATIENT HEALTH QUESTIONNAIRE - PHQ9
1. LITTLE INTEREST OR PLEASURE IN DOING THINGS: NOT AT ALL
SUM OF ALL RESPONSES TO PHQ QUESTIONS 1-9: 0
2. FEELING DOWN, DEPRESSED OR HOPELESS: NOT AT ALL
SUM OF ALL RESPONSES TO PHQ QUESTIONS 1-9: 0

## 2025-05-05 NOTE — PROGRESS NOTES
[facility]  [facility]  [address]  [facility] [address]  Dept: [phone]  Loc: [phone]    5/5/2025    Chief Complaint   Patient presents with    Medicare AWV     Here for AWV and check up for chronic issues.   Has not been seen in office for several years. No labs completed within the past year.   Has been following with GYN for hot flashes--instructed to follow up with this office. Tried Estradiol with minimal benefit.         Medicare Annual Wellness Visit    Edith BIRD Coughlinor is here for Medicare AWV (Here for AWV and check up for chronic issues. /Has not been seen in office for several years. No labs completed within the past year. /Has been following with GYN for hot flashes--instructed to follow up with this office. Tried Estradiol with minimal benefit.)    Assessment & Plan   1. Initial Medicare annual wellness visit  2. Dyslipidemia  -     Comprehensive Metabolic Panel; Future  -     Lipid Panel; Future  3. Age-related osteoporosis without current pathological fracture  4. Other fatigue  -     Comprehensive Metabolic Panel; Future  -     CBC with Auto Differential; Future  -     TSH; Future  -     T4, Free; Future  5. Menopausal vasomotor syndrome    Proceed with lab testing as above due to fatigue and dyslipidemia    Various treatment options discussed with the patient due to her menopausal vasomotor symptoms including both hormonal and nonhormonal therapies.  Patient opts to decline hormone replacement therapy at this time and feels that she can manage the symptoms well on her own.    Various options also discussed for treatment of osteoporosis.  These include 1200 mg of calcium daily plus vitamin D.  Regular weightbearing exercises recommended.  We also discussed weekly oral bisphosphonates, Prolia every 6 months, or yearly IV Reclast.  She will consider these options and follow-up with the rheumatologist in July as planned.    [facility]

## 2025-05-05 NOTE — PATIENT INSTRUCTIONS
Calcium 1200mg daily + vitamin D       Learning About Vision Tests  What are vision tests?     The four most common vision tests are visual acuity tests, refraction, visual field tests, and color vision tests.  Visual acuity (sharpness) tests  These tests are used:  To see if you need glasses or contact lenses.  To monitor an eye problem.  To check an eye injury.  Visual acuity tests are done as part of routine exams. You may also have this test when you get your 's license or apply for some types of jobs.  Visual field tests  These tests are used:  To check for vision loss in any area of your range of vision.  To screen for certain eye diseases.  To look for nerve damage after a stroke, head injury, or other problem that could reduce blood flow to the brain.  Refraction and color tests  A refraction test is done to find the right prescription for glasses and contact lenses.  A color vision test is done to check for color blindness.  Color vision is often tested as part of a routine exam. You may also have this test when you apply for a job where recognizing different colors is important, such as , electronics, or the .  How are vision tests done?  Visual acuity test   You cover one eye at a time.  You read aloud from a wall chart across the room.  You read aloud from a small card that you hold in your hand.  Refraction   You look into a special device.  The device puts lenses of different strengths in front of each eye to see how strong your glasses or contact lenses need to be.  Visual field tests   Your doctor may have you look through special machines.  Or your doctor may simply have you stare straight ahead while they move a finger into and out of your field of vision.  Color vision test   You look at pieces of printed test patterns in various colors. You say what number or symbol you see.  Your doctor may have you trace the number or symbol using a pointer.  How do these tests

## 2025-05-07 ENCOUNTER — RESULTS FOLLOW-UP (OUTPATIENT)
Dept: FAMILY MEDICINE CLINIC | Age: 70
End: 2025-05-07

## 2025-05-07 LAB
ALBUMIN: 4.5 G/DL (ref 3.5–5.2)
ALK PHOSPHATASE: 74 U/L (ref 30–146)
ALT SERPL-CCNC: 22 U/L (ref 5–33)
ANION GAP SERPL CALCULATED.3IONS-SCNC: 13 MMOL/L (ref 7–16)
AST SERPL-CCNC: 22 U/L (ref 9–40)
BASOPHILS ABSOLUTE: 0.05 K/UL (ref 0–0.2)
BASOPHILS RELATIVE PERCENT: 1 % (ref 0–2)
BILIRUB SERPL-MCNC: 0.3 MG/DL
BUN BLDV-MCNC: 24 MG/DL (ref 8–23)
CALCIUM SERPL-MCNC: 8.9 MG/DL (ref 8.6–10.5)
CHLORIDE BLD-SCNC: 106 MMOL/L (ref 96–107)
CHOLESTEROL, TOTAL: 220 MG/DL (ref 100–199)
CHOLESTEROL/HDL RATIO: 2.7 (ref 2–4.5)
CO2: 24 MMOL/L (ref 18–32)
CREAT SERPL-MCNC: 0.71 MG/DL (ref 0.51–1.15)
EGFR IF NONAFRICAN AMERICAN: 93 ML/MIN/1.73M2
EOSINOPHILS ABSOLUTE: 0.14 K/UL (ref 0–0.8)
EOSINOPHILS RELATIVE PERCENT: 2.7 % (ref 0–5)
GLUCOSE: 84 MG/DL (ref 70–100)
HCT VFR BLD CALC: 43.9 % (ref 35–47)
HDLC SERPL-MCNC: 83 MG/DL
HEMOGLOBIN: 13.9 G/DL (ref 11.9–16)
IMMATURE GRANS (ABS): 0.02 K/UL (ref 0–0.06)
IMMATURE GRANULOCYTES %: 0.4 % (ref 0–2)
LDL CHOLESTEROL: 129 MG/DL
LDL/HDL RATIO: 1.6
LYMPHOCYTES ABSOLUTE: 1.84 K/UL (ref 0.9–5.2)
LYMPHOCYTES RELATIVE PERCENT: 35.7 % (ref 20–45)
MCH RBC QN AUTO: 30.3 PG (ref 26–33)
MCHC RBC AUTO-ENTMCNC: 31.7 G/DL (ref 32–35)
MCV RBC AUTO: 96 FL (ref 75–100)
MONOCYTES ABSOLUTE: 0.49 K/UL (ref 0.1–1)
MONOCYTES RELATIVE PERCENT: 9.5 % (ref 0–13)
NEUTROPHILS ABSOLUTE: 2.61 K/UL (ref 1.9–8)
NEUTROPHILS RELATIVE PERCENT: 50.7 % (ref 45–75)
PDW BLD-RTO: 12.4 % (ref 11.2–14.8)
PLATELET # BLD: 203 THOUS/CMM (ref 140–440)
POTASSIUM SERPL-SCNC: 4.6 MMOL/L (ref 3.5–5.4)
RBC # BLD: 4.59 MILL/CMM (ref 3.8–5.2)
SODIUM BLD-SCNC: 143 MMOL/L (ref 135–148)
T4 FREE: 1.04 NG/DL (ref 0.8–1.9)
TOTAL PROTEIN: 6.7 G/DL (ref 6–8.3)
TRIGL SERPL-MCNC: 42 MG/DL (ref 20–149)
TSH SERPL DL<=0.05 MIU/L-ACNC: 1.24 UIU/ML (ref 0.27–4.2)
VLDLC SERPL CALC-MCNC: 8 MG/DL
WBC # BLD: 5.2 THDS/CMM (ref 3.6–11)

## 2025-05-08 ENCOUNTER — TELEPHONE (OUTPATIENT)
Dept: FAMILY MEDICINE CLINIC | Age: 70
End: 2025-05-08

## 2025-05-08 DIAGNOSIS — R23.2 HOT FLASHES: Primary | ICD-10-CM

## 2025-05-08 NOTE — TELEPHONE ENCOUNTER
We can order hormonal labs, but they are likely to tell us that her hormone levels are in the post-menopausal range (which is what we already know).  I will pend the labs and she can decide if she wants to get them done.

## 2025-05-08 NOTE — TELEPHONE ENCOUNTER
Detailed message left for pt with this information. Instructed her to let our office know if she decides not to complete labs.   Orders mailed.

## 2025-05-08 NOTE — TELEPHONE ENCOUNTER
Roxy Smith MD  [facility] Clinical Staff  Please notify the patient that her metabolic panel looks good overall.  BUN is slightly elevated, consistent with dehydration.  Have her increase her fluid intake.  Blood count shows no significant abnormalities.  Cholesterol panel shows elevated total cholesterol at 220 and slightly elevated LDL at 129.  Good cholesterol (HDL) is great at 83.  Work on a low-fat diet and regular aerobic exercise to help with this.  Thyroid numbers are normal.  CG (copied from result notes)    Patient notified and instructed regarding this, verbalized understanding.    She would like to know if you would be willing to order some additional labs (hormone levels, etc) to check on a sourse for her hot flashes. If you are ok with orders, no need to call her back--just mail to her home.    Please advise.

## 2025-05-16 LAB
ESTRADIOL LEVEL: 10 PG/ML
FOLLICLE STIMULATING HORMONE: 69.7 MIU/ML
LH: 41.9 MIU/ML

## 2025-06-05 ENCOUNTER — TELEPHONE (OUTPATIENT)
Dept: FAMILY MEDICINE CLINIC | Age: 70
End: 2025-06-05

## 2025-06-05 NOTE — TELEPHONE ENCOUNTER
She could do a trial of Veozah.  It is a new non-hormonal treatment for hot flashes.  She may want to check to see if this is covered by her insurance.  If she opts to go with the medication, she will need monitoring of her liver function tests while taking the medication. CAROLYN   complains of pain/discomfort

## 2025-06-05 NOTE — TELEPHONE ENCOUNTER
LM on nurse ANGELO asking what she can do or take for her hot flashes. The hot flashes get so intense that she gets a rash on her torso. She sates she had labs done recently and they came back normal. She has used OTC productus without relief. She has also taken estrogen/progesterone in the past but stopped them because she didn't think they were helping. Please advise.

## 2025-06-06 NOTE — TELEPHONE ENCOUNTER
Spoke to pt and notified her of CG response. She stated that she isn't sure she wants to try this medication so she will think about it an let the office know.

## 2025-07-21 ENCOUNTER — HOSPITAL ENCOUNTER (EMERGENCY)
Age: 70
Discharge: HOME | End: 2025-07-21
Payer: MEDICARE

## 2025-07-21 VITALS
BODY MASS INDEX: 18.4 KG/M2 | DIASTOLIC BLOOD PRESSURE: 68 MMHG | OXYGEN SATURATION: 98 % | TEMPERATURE: 98.1 F | HEART RATE: 88 BPM | RESPIRATION RATE: 16 BRPM | SYSTOLIC BLOOD PRESSURE: 100 MMHG | WEIGHT: 114 LBS

## 2025-07-21 DIAGNOSIS — L03.012 PARONYCHIA OF FINGER OF LEFT HAND: Primary | ICD-10-CM

## 2025-07-21 PROCEDURE — 99203 OFFICE O/P NEW LOW 30 MIN: CPT

## 2025-07-21 PROCEDURE — 99213 OFFICE O/P EST LOW 20 MIN: CPT

## 2025-07-21 RX ORDER — CEPHALEXIN 500 MG/1
500 CAPSULE ORAL 4 TIMES DAILY
Qty: 28 CAPSULE | Refills: 0 | Status: SHIPPED | OUTPATIENT
Start: 2025-07-21 | End: 2025-07-28

## 2025-07-21 RX ORDER — CALCIUM CARBONATE 500(1250)
500 TABLET ORAL DAILY
COMMUNITY

## 2025-07-21 ASSESSMENT — PAIN DESCRIPTION - LOCATION: LOCATION: FINGER (COMMENT WHICH ONE)

## 2025-07-21 ASSESSMENT — PAIN DESCRIPTION - ORIENTATION: ORIENTATION: LEFT

## 2025-07-21 ASSESSMENT — PAIN DESCRIPTION - DESCRIPTORS: DESCRIPTORS: THROBBING

## 2025-07-21 ASSESSMENT — PAIN DESCRIPTION - FREQUENCY: FREQUENCY: INTERMITTENT

## 2025-07-21 ASSESSMENT — PAIN SCALES - GENERAL: PAINLEVEL_OUTOF10: 9

## 2025-07-21 ASSESSMENT — PAIN - FUNCTIONAL ASSESSMENT
PAIN_FUNCTIONAL_ASSESSMENT: 0-10
PAIN_FUNCTIONAL_ASSESSMENT: ACTIVITIES ARE NOT PREVENTED

## 2025-07-21 NOTE — ED PROVIDER NOTES
[facility]  [address] [place] [address]  [phone]  Go to   Go directly to [facility] ER, If symptoms worsen    DISCHARGE MEDICATIONS:  Discharge Medication List as of 7/21/2025  8:36 AM        START taking these medications    Details   cephALEXin (KEFLEX) 500 MG capsule Take 1 capsule by mouth 4 times daily for 7 days, Disp-28 capsule, R-0Normal           Discharge Medication List as of 7/21/2025  8:36 AM          ZAHIRA Champagne CNP, Alecksa N, APRN - CNP  07/21/25 0954

## 2025-07-21 NOTE — ED TRIAGE NOTES
Edith arrives to room with complaint of left  hand 3rd finger swollen and painful symptoms started last Tue.  States she squeezed the finger last night and drainage came out    Has not taken anything for pain.

## 2025-07-24 ENCOUNTER — HOSPITAL ENCOUNTER (EMERGENCY)
Age: 70
Discharge: HOME | End: 2025-07-24
Payer: MEDICARE

## 2025-07-24 VITALS
DIASTOLIC BLOOD PRESSURE: 74 MMHG | HEART RATE: 83 BPM | OXYGEN SATURATION: 98 % | BODY MASS INDEX: 18.32 KG/M2 | SYSTOLIC BLOOD PRESSURE: 110 MMHG | TEMPERATURE: 97 F | HEIGHT: 66 IN | RESPIRATION RATE: 16 BRPM | WEIGHT: 114 LBS

## 2025-07-24 DIAGNOSIS — L03.012 PARONYCHIA OF FINGER OF LEFT HAND: Primary | ICD-10-CM

## 2025-07-24 PROCEDURE — 99213 OFFICE O/P EST LOW 20 MIN: CPT | Performed by: EMERGENCY MEDICINE

## 2025-07-24 PROCEDURE — 87147 CULTURE TYPE IMMUNOLOGIC: CPT

## 2025-07-24 PROCEDURE — 87070 CULTURE OTHR SPECIMN AEROBIC: CPT

## 2025-07-24 PROCEDURE — 87205 SMEAR GRAM STAIN: CPT

## 2025-07-24 PROCEDURE — 99213 OFFICE O/P EST LOW 20 MIN: CPT

## 2025-07-24 RX ORDER — SULFAMETHOXAZOLE AND TRIMETHOPRIM 800; 160 MG/1; MG/1
1 TABLET ORAL 2 TIMES DAILY
Qty: 14 TABLET | Refills: 0 | Status: SHIPPED | OUTPATIENT
Start: 2025-07-24 | End: 2025-07-31

## 2025-07-24 RX ORDER — IBUPROFEN 200 MG
200 TABLET ORAL EVERY 6 HOURS PRN
COMMUNITY

## 2025-07-24 RX ORDER — MUPIROCIN 2 %
OINTMENT (GRAM) TOPICAL
Qty: 30 G | Refills: 0 | Status: SHIPPED | OUTPATIENT
Start: 2025-07-24 | End: 2025-07-31

## 2025-07-24 ASSESSMENT — PAIN DESCRIPTION - PAIN TYPE: TYPE: ACUTE PAIN

## 2025-07-24 ASSESSMENT — PAIN DESCRIPTION - ONSET: ONSET: GRADUAL

## 2025-07-24 ASSESSMENT — PAIN DESCRIPTION - ORIENTATION: ORIENTATION: LEFT

## 2025-07-24 ASSESSMENT — PAIN - FUNCTIONAL ASSESSMENT
PAIN_FUNCTIONAL_ASSESSMENT: ACTIVITIES ARE NOT PREVENTED
PAIN_FUNCTIONAL_ASSESSMENT: 0-10

## 2025-07-24 ASSESSMENT — PAIN SCALES - GENERAL: PAINLEVEL_OUTOF10: 4

## 2025-07-24 ASSESSMENT — PAIN DESCRIPTION - FREQUENCY: FREQUENCY: CONTINUOUS

## 2025-07-24 ASSESSMENT — PAIN DESCRIPTION - LOCATION: LOCATION: FINGER (COMMENT WHICH ONE)

## 2025-07-24 NOTE — ED TRIAGE NOTES
Arrives to [facility] for the evaluation of left middle finger paronychia recheck.  Was seen at  on 7/21/25 and prescribed Cephalexin.  Tip of the left middle finger is swollen, tender, red and there is an area filled with pus.  Is draining clear, liquid at this time.  Rates pain 4/10 in severity.  Has been soaking in Epsom salt and applying ice as needed.  Afebrile.  Waiting provider to assess.

## 2025-07-24 NOTE — ED PROVIDER NOTES
[facility] URGENT [facility]  Urgent Care Encounter       CHIEF COMPLAINT       Chief Complaint   Patient presents with    Paronychia     Left Middle Finger- Recheck        Nurses Notes reviewed and I agree except as noted in the HPI.  HISTORY OF PRESENT ILLNESS   Edith Fuentes is a 70 y.o. female who presents for swelling/infection to the left middle finger.  Patient was seen 3 days ago at this urgent care and diagnosed with paronychia.  Placed on cephalexin but symptoms do not seem to be improving.  Patient has significant swelling to the distal end of her finger.  States the area is painful and currently rates her pain 4 on 10 scale.    HPI    REVIEW OF SYSTEMS     Review of Systems   Constitutional:  Negative for activity change, fatigue and fever.   Skin:  Positive for wound (left middle finger).       PAST MEDICAL HISTORY         Diagnosis Date    Age-related osteoporosis without current pathological fracture 05/05/2025    Kidney stones        SURGICALHISTORY     Patient  has a past surgical history that includes [place] tooth extraction; Cystoscopy (Right, 7/19/2020); Breast enhancement surgery (Bilateral, 2010); and skin biopsy (N/A, 8/22/2022).    CURRENT MEDICATIONS       Discharge Medication List as of 7/24/2025 11:40 AM        CONTINUE these medications which have NOT CHANGED    Details   ibuprofen (ADVIL;MOTRIN) 200 MG tablet Take 1 tablet by mouth every 6 hours as needed for PainHistorical Med      calcium carbonate (OSCAL) 500 MG TABS tablet Take 1 tablet by mouth dailyHistorical Med      cephALEXin (KEFLEX) 500 MG capsule Take 1 capsule by mouth 4 times daily for 7 days, Disp-28 capsule, R-0Normal      Vitamin D (CHOLECALCIFEROL) 25 MCG (1000 UT) TABS tablet Take 2 tablets by mouth dailyHistorical Med      Multiple Vitamins-Minerals (MULTIVITAMIN ADULT PO) Take by mouth dailyHistorical Med             ALLERGIES     Patient is has no known allergies.    Patients   Immunization History   Administered

## 2025-07-24 NOTE — DISCHARGE INSTRUCTIONS
Continue cephalexin    Begin Bactrim twice daily    Mupirocin ointment 2-3 times daily    Good handwashing    Return for new or worsening symptoms

## 2025-07-27 LAB
BACTERIA SPEC AEROBE CULT: NORMAL
GRAM STN SPEC: NORMAL

## (undated) DEVICE — GLOVE ORANGE PI 7 1/2   MSG9075

## (undated) DEVICE — SYRINGE BULB 50/CS 48/PLT: Brand: MEDEGEN MEDICAL PRODUCTS, LLC

## (undated) DEVICE — GLOVE ORANGE PI 8   MSG9080

## (undated) DEVICE — CONMED DISPOSABLE BIPOLAR CABLE, 10' (3.05M): Brand: CONMED

## (undated) DEVICE — GUIDEWIRE ENDOSCP L150CM DIA0.035IN TIP 3CM PTFE NIT

## (undated) DEVICE — SUTURE VCRL SZ 5-0 L18IN ABSRB UD L13MM P-3 3/8 CIR PRIM J493G

## (undated) DEVICE — SUTURE VCRL SZ 3-0 L18IN ABSRB VLT SUTUPAK PRECUT W/O NDL J104T

## (undated) DEVICE — GAUZE,SPONGE,8"X4",12PLY,XRAY,STRL,LF: Brand: MEDLINE

## (undated) DEVICE — Device

## (undated) DEVICE — CHLORAPREP 26ML CLEAR

## (undated) DEVICE — SKIN AFFIX SURG ADHESIVE 72/CS 0.55ML: Brand: MEDLINE

## (undated) DEVICE — SURE SET SINGLE BASIN-LF: Brand: MEDLINE INDUSTRIES, INC.

## (undated) DEVICE — GOWN,SIRUS,NONRNF,SETINSLV,XL,20/CS: Brand: MEDLINE

## (undated) DEVICE — DRAIN,WOUND,15FR,3/16,FULL-FLUTED: Brand: MEDLINE

## (undated) DEVICE — MAGNETIC DRAPE: Brand: DEVON

## (undated) DEVICE — APPLIER CLP L9.375IN APER 2.1MM CLS L3.8MM 20 SM TI CLP

## (undated) DEVICE — Z INACTIVE USE 2660664 SOLUTION IRRIG 3000ML 0.9% SOD CHL USP UROMATIC PLAS CONT

## (undated) DEVICE — EVACUATOR SURG 100CC SIL BLB SUCT RESVR FOR CLS WND DRNGE

## (undated) DEVICE — BLADE ES ELASTOMERIC COAT INSUL DURABLE BEND UPTO 90DEG

## (undated) DEVICE — CATHETER URET 5FR L70CM POLYUR CONE FLX TIP KINK RESIST W/

## (undated) DEVICE — PACK,SET UP,NO DRAPES: Brand: MEDLINE

## (undated) DEVICE — GLOVE ORANGE PI 7   MSG9070

## (undated) DEVICE — GARMENT,MEDLINE,DVT,INT,CALF,MED, GEN2: Brand: MEDLINE

## (undated) DEVICE — DRAPE,EENT,SPLIT,STERILE: Brand: MEDLINE

## (undated) DEVICE — SUTURE SILK 2-0 CP-1 30IN N ABSRB BRAID BLK 443H

## (undated) DEVICE — SUTURE VCRL SZ 3-0 L27IN ABSRB UD L26MM SH 1/2 CIR J416H

## (undated) DEVICE — TOWEL,OR,DSP,ST,BLUE,DLX,4/PK,20PK/CS: Brand: MEDLINE

## (undated) DEVICE — SPONGE GZ W4XL4IN COT 12 PLY TYP VII WVN C FLD DSGN

## (undated) DEVICE — NEEDLE HYPO 27GA L1.25IN GRY POLYPR HUB S STL REG BVL STR